# Patient Record
Sex: FEMALE | Race: WHITE | NOT HISPANIC OR LATINO | URBAN - METROPOLITAN AREA
[De-identification: names, ages, dates, MRNs, and addresses within clinical notes are randomized per-mention and may not be internally consistent; named-entity substitution may affect disease eponyms.]

---

## 2017-07-28 ENCOUNTER — OUTPATIENT (OUTPATIENT)
Dept: OUTPATIENT SERVICES | Facility: HOSPITAL | Age: 67
LOS: 1 days | End: 2017-07-28
Payer: MEDICARE

## 2017-07-28 ENCOUNTER — APPOINTMENT (OUTPATIENT)
Age: 67
End: 2017-07-28

## 2017-07-28 DIAGNOSIS — M54.16 RADICULOPATHY, LUMBAR REGION: ICD-10-CM

## 2017-07-28 PROCEDURE — 64483 NJX AA&/STRD TFRM EPI L/S 1: CPT

## 2018-11-19 ENCOUNTER — TRANSCRIPTION ENCOUNTER (OUTPATIENT)
Age: 68
End: 2018-11-19

## 2019-10-23 ENCOUNTER — APPOINTMENT (OUTPATIENT)
Dept: PULMONOLOGY | Facility: CLINIC | Age: 69
End: 2019-10-23
Payer: MEDICARE

## 2019-10-23 VITALS
SYSTOLIC BLOOD PRESSURE: 118 MMHG | TEMPERATURE: 98 F | HEART RATE: 72 BPM | BODY MASS INDEX: 22.68 KG/M2 | OXYGEN SATURATION: 97 % | DIASTOLIC BLOOD PRESSURE: 83 MMHG | RESPIRATION RATE: 16 BRPM | WEIGHT: 128 LBS | HEIGHT: 63 IN

## 2019-10-23 PROCEDURE — 95012 NITRIC OXIDE EXP GAS DETER: CPT

## 2019-10-23 PROCEDURE — 88738 HGB QUANT TRANSCUTANEOUS: CPT

## 2019-10-23 PROCEDURE — 71046 X-RAY EXAM CHEST 2 VIEWS: CPT

## 2019-10-23 PROCEDURE — 94729 DIFFUSING CAPACITY: CPT

## 2019-10-23 PROCEDURE — 36415 COLL VENOUS BLD VENIPUNCTURE: CPT

## 2019-10-23 PROCEDURE — 99204 OFFICE O/P NEW MOD 45 MIN: CPT | Mod: 25

## 2019-10-23 PROCEDURE — 94727 GAS DIL/WSHOT DETER LNG VOL: CPT

## 2019-10-23 PROCEDURE — 94060 EVALUATION OF WHEEZING: CPT

## 2019-10-24 NOTE — ASSESSMENT
[FreeTextEntry1] : Venipuncture with labs drawn in office\par Started her on doxycycline and Medrol Dosepak

## 2019-10-24 NOTE — PHYSICAL EXAM
[General Appearance - Well Developed] : well developed [Normal Appearance] : normal appearance [Well Groomed] : well groomed [General Appearance - Well Nourished] : well nourished [No Deformities] : no deformities [General Appearance - In No Acute Distress] : no acute distress [Normal Oropharynx] : normal oropharynx [Heart Rate And Rhythm] : heart rate and rhythm were normal [Heart Sounds] : normal S1 and S2 [Murmurs] : no murmurs present [Abdomen Soft] : soft [Abdomen Tenderness] : non-tender [Abdomen Mass (___ Cm)] : no abdominal mass palpated [Nail Clubbing] : no clubbing of the fingernails [Cyanosis, Localized] : no localized cyanosis [Petechial Hemorrhages (___cm)] : no petechial hemorrhages [] : no ischemic changes [FreeTextEntry1] : moderate bilateral expiratory wheezes

## 2019-10-24 NOTE — HISTORY OF PRESENT ILLNESS
[FreeTextEntry1] : Sandra is a pleasant 69-year-old female with history of osteoarthritis, she states that she has been sick for the last one week, she has greenish productive cough and chills, she also has chest congestion, but she has no shortness of breath or chest pain

## 2019-10-24 NOTE — PROCEDURE
[FreeTextEntry1] : Chest x-ray PA and lateral views performed in my office today showed clear lungs, no evidence of infiltrates or pleural effusions.\par \par Pulmonary Function Test: Lung Volume: Within normal limits; Spirometry: Within normal limits with no improvement post bronchodilator; Diffusion: Within normal limits.\par \par Exhaled nitric oxide level is  29 PPB\par

## 2019-10-27 LAB
ALBUMIN SERPL ELPH-MCNC: 4.5 G/DL
ALP BLD-CCNC: 52 U/L
ALT SERPL-CCNC: 19 U/L
ANION GAP SERPL CALC-SCNC: 12 MMOL/L
AST SERPL-CCNC: 25 U/L
BASOPHILS # BLD AUTO: 0.05 K/UL
BASOPHILS NFR BLD AUTO: 0.9 %
BILIRUB SERPL-MCNC: 0.4 MG/DL
BUN SERPL-MCNC: 21 MG/DL
CALCIUM SERPL-MCNC: 10.5 MG/DL
CHLORIDE SERPL-SCNC: 104 MMOL/L
CO2 SERPL-SCNC: 24 MMOL/L
CREAT SERPL-MCNC: 0.77 MG/DL
EOSINOPHIL # BLD AUTO: 0.1 K/UL
EOSINOPHIL NFR BLD AUTO: 1.8 %
ERYTHROCYTE [SEDIMENTATION RATE] IN BLOOD BY WESTERGREN METHOD: 19 MM/HR
GLUCOSE SERPL-MCNC: 77 MG/DL
HCT VFR BLD CALC: 40 %
HGB BLD-MCNC: 12.3 G/DL
IMM GRANULOCYTES NFR BLD AUTO: 0.2 %
LYMPHOCYTES # BLD AUTO: 1.76 K/UL
LYMPHOCYTES NFR BLD AUTO: 31.4 %
MAN DIFF?: NORMAL
MCHC RBC-ENTMCNC: 29.8 PG
MCHC RBC-ENTMCNC: 30.8 GM/DL
MCV RBC AUTO: 96.9 FL
MONOCYTES # BLD AUTO: 0.56 K/UL
MONOCYTES NFR BLD AUTO: 10 %
NEUTROPHILS # BLD AUTO: 3.12 K/UL
NEUTROPHILS NFR BLD AUTO: 55.7 %
PLATELET # BLD AUTO: 316 K/UL
POCT - HEMOGLOBIN (HGB), QUANTITATIVE, TRANSCUTANEOUS: 13.1
POTASSIUM SERPL-SCNC: 4.6 MMOL/L
PROT SERPL-MCNC: 7.1 G/DL
RBC # BLD: 4.13 M/UL
RBC # FLD: 13.2 %
SODIUM SERPL-SCNC: 140 MMOL/L
TOTAL IGE SMQN RAST: 4 KU/L
WBC # FLD AUTO: 5.6 K/UL

## 2019-10-30 ENCOUNTER — APPOINTMENT (OUTPATIENT)
Dept: PULMONOLOGY | Facility: CLINIC | Age: 69
End: 2019-10-30
Payer: MEDICARE

## 2019-10-30 VITALS
TEMPERATURE: 98.4 F | SYSTOLIC BLOOD PRESSURE: 115 MMHG | OXYGEN SATURATION: 98 % | RESPIRATION RATE: 16 BRPM | DIASTOLIC BLOOD PRESSURE: 79 MMHG | HEART RATE: 74 BPM

## 2019-10-30 PROCEDURE — G0008: CPT

## 2019-10-30 PROCEDURE — 99214 OFFICE O/P EST MOD 30 MIN: CPT | Mod: 25

## 2019-10-30 PROCEDURE — 90653 IIV ADJUVANT VACCINE IM: CPT

## 2019-10-30 PROCEDURE — 94060 EVALUATION OF WHEEZING: CPT

## 2019-10-30 NOTE — ASSESSMENT
[FreeTextEntry1] : Fluad vaccine administered today.\par Finish doxycycline\par Mucinex as an expectorant

## 2020-09-01 ENCOUNTER — APPOINTMENT (OUTPATIENT)
Dept: PULMONOLOGY | Facility: CLINIC | Age: 70
End: 2020-09-01
Payer: MEDICARE

## 2020-09-01 VITALS
DIASTOLIC BLOOD PRESSURE: 66 MMHG | HEIGHT: 63 IN | OXYGEN SATURATION: 96 % | WEIGHT: 125 LBS | SYSTOLIC BLOOD PRESSURE: 96 MMHG | BODY MASS INDEX: 22.15 KG/M2 | TEMPERATURE: 98 F | HEART RATE: 78 BPM

## 2020-09-01 PROCEDURE — G0008: CPT

## 2020-09-01 PROCEDURE — 99214 OFFICE O/P EST MOD 30 MIN: CPT | Mod: 25

## 2020-09-01 PROCEDURE — 90662 IIV NO PRSV INCREASED AG IM: CPT

## 2020-09-16 ENCOUNTER — APPOINTMENT (OUTPATIENT)
Dept: PULMONOLOGY | Facility: CLINIC | Age: 70
End: 2020-09-16
Payer: MEDICARE

## 2020-09-16 VITALS
OXYGEN SATURATION: 97 % | DIASTOLIC BLOOD PRESSURE: 83 MMHG | TEMPERATURE: 98 F | RESPIRATION RATE: 15 BRPM | HEART RATE: 63 BPM | SYSTOLIC BLOOD PRESSURE: 121 MMHG

## 2020-09-16 DIAGNOSIS — B35.1 TINEA UNGUIUM: ICD-10-CM

## 2020-09-16 PROCEDURE — 90732 PPSV23 VACC 2 YRS+ SUBQ/IM: CPT

## 2020-09-16 PROCEDURE — G0009: CPT

## 2020-09-16 PROCEDURE — 99214 OFFICE O/P EST MOD 30 MIN: CPT | Mod: 25

## 2020-09-17 NOTE — REASON FOR VISIT
[Follow-Up] : a follow-up visit [Cough] : cough [TextBox_44] : Complains of yellow nails on bilateral feet

## 2020-09-17 NOTE — ASSESSMENT
[FreeTextEntry1] : Start Lamisil for 3 months for onychomycosis.\par Start Singulair for seasonal allergies.\par Pneumovax given today.

## 2020-09-17 NOTE — DISCUSSION/SUMMARY
[FreeTextEntry1] : Bilateral toenail onychomycosis.  Postnasal drip, likely secondary to seasonal allergies

## 2020-09-17 NOTE — PHYSICAL EXAM
[No Acute Distress] : no acute distress [Normal Oropharynx] : normal oropharynx [Normal Appearance] : normal appearance [No Neck Mass] : no neck mass [Normal Rate/Rhythm] : normal rate/rhythm [Normal S1, S2] : normal s1, s2 [No Murmurs] : no murmurs [No Resp Distress] : no resp distress [Clear to Auscultation Bilaterally] : clear to auscultation bilaterally [No Abnormalities] : no abnormalities [Benign] : benign [Normal Gait] : normal gait [No Edema] : no edema [FROM] : FROM [Normal Color/ Pigmentation] : normal color/ pigmentation [No Focal Deficits] : no focal deficits [Oriented x3] : oriented x3 [Normal Affect] : normal affect [TextBox_105] : Bilateral toenail onychomycosis

## 2020-11-17 ENCOUNTER — APPOINTMENT (OUTPATIENT)
Dept: PULMONOLOGY | Facility: CLINIC | Age: 70
End: 2020-11-17
Payer: MEDICARE

## 2020-11-18 ENCOUNTER — APPOINTMENT (OUTPATIENT)
Dept: PULMONOLOGY | Facility: CLINIC | Age: 70
End: 2020-11-18
Payer: MEDICARE

## 2020-11-18 VITALS
TEMPERATURE: 98.2 F | HEART RATE: 76 BPM | SYSTOLIC BLOOD PRESSURE: 103 MMHG | DIASTOLIC BLOOD PRESSURE: 71 MMHG | OXYGEN SATURATION: 95 %

## 2020-11-18 PROCEDURE — 71046 X-RAY EXAM CHEST 2 VIEWS: CPT

## 2020-11-18 PROCEDURE — 99214 OFFICE O/P EST MOD 30 MIN: CPT | Mod: 25

## 2020-11-18 PROCEDURE — 36415 COLL VENOUS BLD VENIPUNCTURE: CPT

## 2020-11-19 NOTE — ASSESSMENT
[FreeTextEntry1] : Venipuncture with labs drawn in office\par Get noncontrast chest CT scan for further evaluation

## 2020-11-19 NOTE — PROCEDURE
[FreeTextEntry1] : \par  Xray Chest 2 Views PA/Lat             Final\par   \par Chest x-ray PA and lateral views performed in my office today showed a small left upper lobe linear density, no evidence of pleural effusions. \par \par \par  Ordered by: SCOTT DIAZ       Collected/Examined: 18Nov2020 08:04PM       \par Verification Required       Stage: Final       \par  Performed at: In Office       Performed by: SCOTT DIAZ       Resulted: 18Nov2020 08:04PM       Last Updated: 18Nov2020 08:05PM

## 2020-11-22 LAB
ACE BLD-CCNC: 31 U/L
ALBUMIN SERPL ELPH-MCNC: 4.7 G/DL
ALP BLD-CCNC: 71 U/L
ALT SERPL-CCNC: 15 U/L
ANION GAP SERPL CALC-SCNC: 8 MMOL/L
AST SERPL-CCNC: 23 U/L
BASOPHILS # BLD AUTO: 0.03 K/UL
BASOPHILS NFR BLD AUTO: 0.5 %
BILIRUB SERPL-MCNC: 0.4 MG/DL
BUN SERPL-MCNC: 16 MG/DL
CALCIUM SERPL-MCNC: 10.8 MG/DL
CHLORIDE SERPL-SCNC: 102 MMOL/L
CO2 SERPL-SCNC: 29 MMOL/L
CREAT SERPL-MCNC: 0.82 MG/DL
EOSINOPHIL # BLD AUTO: 0.08 K/UL
EOSINOPHIL NFR BLD AUTO: 1.2 %
ERYTHROCYTE [SEDIMENTATION RATE] IN BLOOD BY WESTERGREN METHOD: 17 MM/HR
GLUCOSE SERPL-MCNC: 70 MG/DL
HCT VFR BLD CALC: 41.6 %
HGB BLD-MCNC: 13.1 G/DL
IMM GRANULOCYTES NFR BLD AUTO: 0.2 %
LYMPHOCYTES # BLD AUTO: 1.92 K/UL
LYMPHOCYTES NFR BLD AUTO: 30 %
MAN DIFF?: NORMAL
MCHC RBC-ENTMCNC: 30.8 PG
MCHC RBC-ENTMCNC: 31.5 GM/DL
MCV RBC AUTO: 97.9 FL
MONOCYTES # BLD AUTO: 0.61 K/UL
MONOCYTES NFR BLD AUTO: 9.5 %
NEUTROPHILS # BLD AUTO: 3.76 K/UL
NEUTROPHILS NFR BLD AUTO: 58.6 %
PLATELET # BLD AUTO: 299 K/UL
POTASSIUM SERPL-SCNC: 4.2 MMOL/L
PROT SERPL-MCNC: 7.6 G/DL
RBC # BLD: 4.25 M/UL
RBC # FLD: 13 %
SODIUM SERPL-SCNC: 138 MMOL/L
WBC # FLD AUTO: 6.41 K/UL

## 2020-12-03 ENCOUNTER — NON-APPOINTMENT (OUTPATIENT)
Age: 70
End: 2020-12-03

## 2020-12-03 ENCOUNTER — APPOINTMENT (OUTPATIENT)
Dept: PULMONOLOGY | Facility: CLINIC | Age: 70
End: 2020-12-03

## 2020-12-07 ENCOUNTER — APPOINTMENT (OUTPATIENT)
Dept: PULMONOLOGY | Facility: CLINIC | Age: 70
End: 2020-12-07
Payer: MEDICARE

## 2020-12-07 VITALS
RESPIRATION RATE: 16 BRPM | OXYGEN SATURATION: 94 % | TEMPERATURE: 98.2 F | SYSTOLIC BLOOD PRESSURE: 129 MMHG | DIASTOLIC BLOOD PRESSURE: 84 MMHG | HEART RATE: 75 BPM

## 2020-12-07 PROCEDURE — 99214 OFFICE O/P EST MOD 30 MIN: CPT

## 2020-12-07 NOTE — ASSESSMENT
[FreeTextEntry1] : I discussed with Sandra at length regarding aforementioned chest CT scan findings in the office today, will perform PET scan and brain MRI without contrast for possible cancer staging.  If FDG uptake on PET scan is localized to the left upper lobe opacity/process, will then refer her for thoracic surgery evaluation for possible left VATS for definitive tissue diagnosis and possible curative intent.\par Advised Sandra to return the office for follow-up after the scan and head MRI are performed.

## 2020-12-07 NOTE — PROCEDURE
[FreeTextEntry1] : Chest CT scan performed on December 2, 2020 showed irregular left upper lobe spiculated masslike opacity measuring 3.2 x 2.7 x 1.7 cm.  There is a central solid component rounded by an irregular groundglass component.  Air bronchograms sign is present.  Although this may be inflammatory 80 image is suggested for further assessment.  Infectious in nature, I favor a malignant process.  Please see detailed discussion.  PET/CT imaging is suggested for further assessment.

## 2020-12-07 NOTE — DISCUSSION/SUMMARY
[FreeTextEntry1] : Please use a newly found irregular left upper lobe spiculated masslike opacity chest CT scan, possibly secondary to lung malignancy

## 2020-12-07 NOTE — REASON FOR VISIT
[Follow-Up] : a follow-up visit [Abnormal CXR/ Chest CT] : an abnormal CXR/ chest CT [TextBox_44] : Found to have abnormal chest CT scan findings, came in for chest CT scan follow-up

## 2020-12-11 ENCOUNTER — APPOINTMENT (OUTPATIENT)
Dept: PULMONOLOGY | Facility: CLINIC | Age: 70
End: 2020-12-11
Payer: MEDICARE

## 2020-12-11 VITALS
HEART RATE: 78 BPM | DIASTOLIC BLOOD PRESSURE: 78 MMHG | SYSTOLIC BLOOD PRESSURE: 118 MMHG | RESPIRATION RATE: 15 BRPM | TEMPERATURE: 98.3 F | OXYGEN SATURATION: 94 %

## 2020-12-11 DIAGNOSIS — E83.52 HYPERCALCEMIA: ICD-10-CM

## 2020-12-11 PROCEDURE — 99214 OFFICE O/P EST MOD 30 MIN: CPT

## 2020-12-12 LAB — SARS-COV-2 N GENE NPH QL NAA+PROBE: NOT DETECTED

## 2020-12-13 PROBLEM — E83.52 HYPERCALCEMIA: Status: ACTIVE | Noted: 2020-11-18

## 2020-12-13 NOTE — PROCEDURE
[FreeTextEntry1] : PET/CT scan on 20 showed minimally FDG avid irregular spiculated opacity in the left upper lobe with central solid is suspicious for an adenocarcinoma spectrum lesion.  Recommend histopathologic correlation.  There is no evidence of FDG-avid regional tatyana or distant metastases.

## 2020-12-13 NOTE — CONSULT LETTER
[Dear  ___] : Dear  [unfilled], [Courtesy Letter:] : I had the pleasure of seeing your patient, [unfilled], in my office today. [Please see my note below.] : Please see my note below. [Consult Closing:] : Thank you very much for allowing me to participate in the care of this patient.  If you have any questions, please do not hesitate to contact me. [Sincerely,] : Sincerely, [FreeTextEntry3] : Dr. Lily Wheeler [DrMarya  ___] : Dr. DIAZ

## 2020-12-13 NOTE — DISCUSSION/SUMMARY
[FreeTextEntry1] : Sandra is a patient with a newly discovered left upper lobe spiculated opacity with minimal FDG uptake on PET scan, suspicious for adenocarcinoma in situ, rule out postinflammatory changes.

## 2020-12-13 NOTE — ASSESSMENT
[FreeTextEntry1] : I discussed with Sandra at length regarding PET scan findings in the office today.  Will refer her to Dr. Boby Landers (Thoracic Surgery) for left VATS for definitive tissue diagnosis and curative intent.\par Will perform preoperative pulmonary function tests to assess her pulmonary reserve.

## 2020-12-13 NOTE — REASON FOR VISIT
[Follow-Up] : a follow-up visit [Abnormal CXR/ Chest CT] : an abnormal CXR/ chest CT [TextBox_44] : Sandra is here for PET scan follow-up.  Offers no complaints.

## 2020-12-14 ENCOUNTER — APPOINTMENT (OUTPATIENT)
Dept: PULMONOLOGY | Facility: CLINIC | Age: 70
End: 2020-12-14
Payer: MEDICARE

## 2020-12-14 VITALS
HEART RATE: 76 BPM | WEIGHT: 124 LBS | DIASTOLIC BLOOD PRESSURE: 57 MMHG | HEIGHT: 62 IN | RESPIRATION RATE: 14 BRPM | OXYGEN SATURATION: 97 % | TEMPERATURE: 97.7 F | SYSTOLIC BLOOD PRESSURE: 101 MMHG | BODY MASS INDEX: 22.82 KG/M2

## 2020-12-14 PROCEDURE — 94750: CPT

## 2020-12-14 PROCEDURE — 88738 HGB QUANT TRANSCUTANEOUS: CPT

## 2020-12-14 PROCEDURE — 94729 DIFFUSING CAPACITY: CPT

## 2020-12-14 PROCEDURE — 95012 NITRIC OXIDE EXP GAS DETER: CPT

## 2020-12-14 PROCEDURE — 94726 PLETHYSMOGRAPHY LUNG VOLUMES: CPT

## 2020-12-14 PROCEDURE — 94010 BREATHING CAPACITY TEST: CPT

## 2020-12-16 ENCOUNTER — APPOINTMENT (OUTPATIENT)
Dept: THORACIC SURGERY | Facility: CLINIC | Age: 70
End: 2020-12-16
Payer: MEDICARE

## 2020-12-16 VITALS
HEIGHT: 62 IN | DIASTOLIC BLOOD PRESSURE: 63 MMHG | SYSTOLIC BLOOD PRESSURE: 97 MMHG | RESPIRATION RATE: 16 BRPM | WEIGHT: 124 LBS | OXYGEN SATURATION: 97 % | BODY MASS INDEX: 22.82 KG/M2 | HEART RATE: 71 BPM

## 2020-12-16 DIAGNOSIS — Z80.3 FAMILY HISTORY OF MALIGNANT NEOPLASM OF BREAST: ICD-10-CM

## 2020-12-16 DIAGNOSIS — Z80.9 FAMILY HISTORY OF MALIGNANT NEOPLASM, UNSPECIFIED: ICD-10-CM

## 2020-12-16 DIAGNOSIS — Z77.090 CONTACT WITH AND (SUSPECTED) EXPOSURE TO ASBESTOS: ICD-10-CM

## 2020-12-16 DIAGNOSIS — Z87.09 PERSONAL HISTORY OF OTHER DISEASES OF THE RESPIRATORY SYSTEM: ICD-10-CM

## 2020-12-16 DIAGNOSIS — Z87.01 PERSONAL HISTORY OF PNEUMONIA (RECURRENT): ICD-10-CM

## 2020-12-16 DIAGNOSIS — Z80.8 FAMILY HISTORY OF MALIGNANT NEOPLASM OF OTHER ORGANS OR SYSTEMS: ICD-10-CM

## 2020-12-16 PROCEDURE — 99204 OFFICE O/P NEW MOD 45 MIN: CPT

## 2020-12-16 RX ORDER — MONTELUKAST 10 MG/1
10 TABLET, FILM COATED ORAL
Qty: 30 | Refills: 2 | Status: DISCONTINUED | COMMUNITY
Start: 2020-09-16 | End: 2020-12-16

## 2020-12-16 RX ORDER — ZOSTER VACCINE RECOMBINANT, ADJUVANTED 50 MCG/0.5
50 KIT INTRAMUSCULAR
Qty: 1 | Refills: 1 | Status: COMPLETED | COMMUNITY
Start: 2020-11-18 | End: 2020-12-16

## 2020-12-16 RX ORDER — CICLOPIROX OLAMINE 7.7 MG/G
0.77 CREAM TOPICAL TWICE DAILY
Qty: 3 | Refills: 3 | Status: DISCONTINUED | COMMUNITY
Start: 2020-10-21 | End: 2020-12-16

## 2020-12-16 RX ORDER — DOXYCYCLINE HYCLATE 100 MG/1
100 TABLET ORAL
Qty: 14 | Refills: 0 | Status: DISCONTINUED | COMMUNITY
Start: 2019-10-23 | End: 2020-12-16

## 2020-12-16 RX ORDER — TERBINAFINE HYDROCHLORIDE 250 MG/1
250 TABLET ORAL DAILY
Qty: 90 | Refills: 0 | Status: DISCONTINUED | COMMUNITY
Start: 2020-09-16 | End: 2020-12-16

## 2020-12-16 RX ORDER — TERBINAFINE HYDROCHLORIDE 1 G/100G
1 CREAM TOPICAL 3 TIMES DAILY
Qty: 1 | Refills: 1 | Status: DISCONTINUED | COMMUNITY
Start: 2020-09-21 | End: 2020-12-16

## 2020-12-16 RX ORDER — METHYLPREDNISOLONE 4 MG/1
4 TABLET ORAL
Qty: 1 | Refills: 0 | Status: DISCONTINUED | COMMUNITY
Start: 2019-10-23 | End: 2020-12-16

## 2020-12-18 PROBLEM — Z77.090 HISTORY OF ASBESTOS EXPOSURE: Status: RESOLVED | Noted: 2020-12-18 | Resolved: 2020-12-18

## 2020-12-18 PROBLEM — Z80.3 FAMILY HISTORY OF MALIGNANT NEOPLASM OF BREAST: Status: ACTIVE | Noted: 2020-12-18

## 2020-12-18 PROBLEM — Z87.01 HISTORY OF PNEUMONIA: Status: RESOLVED | Noted: 2020-12-18 | Resolved: 2020-12-18

## 2020-12-18 PROBLEM — Z80.9 FAMILY HISTORY OF MALIGNANT NEOPLASM: Status: ACTIVE | Noted: 2020-12-18

## 2020-12-18 PROBLEM — Z80.8 FAMILY HISTORY OF MALIGNANT NEOPLASM OF BRAIN: Status: ACTIVE | Noted: 2020-12-18

## 2020-12-18 PROBLEM — Z87.09 HISTORY OF CHRONIC BRONCHITIS: Status: RESOLVED | Noted: 2020-12-18 | Resolved: 2020-12-18

## 2020-12-18 RX ORDER — FEXOFENADINE HCL 60 MG
TABLET ORAL
Refills: 0 | Status: ACTIVE | COMMUNITY

## 2020-12-18 RX ORDER — CHROMIUM 200 MCG
TABLET ORAL
Refills: 0 | Status: ACTIVE | COMMUNITY

## 2020-12-18 RX ORDER — MELOXICAM 7.5 MG/1
7.5 TABLET ORAL
Refills: 0 | Status: ACTIVE | COMMUNITY

## 2020-12-18 NOTE — ASSESSMENT
[FreeTextEntry1] : Ms. ALY GUERRERO, 70 year old female, never smoker, w/ hx of Asthma/Bronchitis, Asbestos exposure, PNA, followed by Pulm Dr. Lily Wheeler. She presented to PCP after receiving the Pneumococcal vaccine, blood work showed elevated Calcium level (patient cannot recall the level), sent for CXR then CT Chest.\par \par PET/CT on 12/9/2020 showed a 2.4 x 1.7cm SUV=0.9 irregular spiculated TEHODORA opacity with central solid component.\par \par I have reviewed the patient's medical records and diagnostic images am suspicious that the lesion may be malignant. Based upon this  I recommended a Lt VATS Robotic-assisted THEODORA wedge rxn, possible lingula-sparing segmentectomy. \par \par Will obtain recent PFTs from Dr. Lily Wheeler's office.\par Medical clearance, PST, COVID testing prior to surgery.\par \par \par I personally performed the services described in the documentation, reviewed the documentation recorded by the scribe in my presence and it accurately and completely records my words and actions.\par \par I, Martha Nunn NP, am scribing for and the presence of KENN Wolf, the following sections HISTORY OF PRESENT ILLNESS, PAST MEDICAL/FAMILY/SOCIAL HISTORY; REVIEW OF SYSTEMS; VITAL SIGNS; PHYSICAL EXAM; DISPOSITION.\par \par

## 2020-12-18 NOTE — HISTORY OF PRESENT ILLNESS
[FreeTextEntry1] : Ms. ALY GUERRERO, 70 year old female, never smoker, w/ hx of Asthma/Bronchitis, Asbestos exposure, PNA, followed by Pulm Dr. Lily Wheeler. She presented to PCP after receiving the Pneumococcal vaccine, blood work showed elevated Calcium level (patient cannot recall the level), sent for CXR then CT Chest.\par \par CT Chest on 12/2/2020:\par - 3.2 x 2.7 x 1.7cm irregular THEODORA spiculated masslike opacity\par \par Brain MRI w/w/o contrast on 12/9/2020 showed HAKAN.\par \par PET/CT on 12/9/2020:\par - 2.4 x 1.7cm SUV=0.9 irregular spiculated THEODORA opacity with central solid component\par \par Patient is here today for CT Sx consultation, referred by Dr. Wheeler. \par

## 2020-12-18 NOTE — DATA REVIEWED
[FreeTextEntry1] : CT Chest on 12/2/2020:\par - 3.2 x 2.7 x 1.7cm irregular THEODORA spiculated masslike opacity\par \par Brain MRI w/w/o contrast on 12/9/2020 showed HAKAN.\par \par PET/CT on 12/9/2020:\par - 2.4 x 1.7cm SUV=0.9 irregular spiculated THEODORA opacity with central solid component

## 2020-12-18 NOTE — CONSULT LETTER
[Consult Letter:] : I had the pleasure of evaluating your patient, [unfilled]. [( Thank you for referring [unfilled] for consultation for _____ )] : Thank you for referring [unfilled] for consultation for [unfilled] [Please see my note below.] : Please see my note below. [Consult Closing:] : Thank you very much for allowing me to participate in the care of this patient.  If you have any questions, please do not hesitate to contact me. [Sincerely,] : Sincerely, [DrMarya  ___] : Dr. DIAZ [DrMarya ___] : Dr. DIAZ [FreeTextEntry2] : Dr. Lily Wheeler (Pulm/Referring)\par Dr. Reza (PCP)\par Dr. Stanton (Hem/Onc) [FreeTextEntry3] : Earl Landers MD, FACS \par , Division of Thoracic Surgery \par [facility] \par Chief, Thoracic Surgery \par [facility] \par Department of Cardiovascular & Thoracic Surgery \par  \par [employer] [facility] [employer] [facility]\par

## 2021-01-04 ENCOUNTER — TRANSCRIPTION ENCOUNTER (OUTPATIENT)
Age: 71
End: 2021-01-04

## 2021-02-19 ENCOUNTER — APPOINTMENT (OUTPATIENT)
Dept: PULMONOLOGY | Facility: CLINIC | Age: 71
End: 2021-02-19
Payer: MEDICARE

## 2021-02-19 PROCEDURE — 99214 OFFICE O/P EST MOD 30 MIN: CPT | Mod: 95

## 2021-02-20 NOTE — PHYSICAL EXAM
[No Acute Distress] : no acute distress [No Cyanosis] : no cyanosis [FROM] : FROM [TextBox_2] : Appears comfortable

## 2021-02-20 NOTE — HISTORY OF PRESENT ILLNESS
[TextBox_4] : Aly recently underwent left VATS with left upper lobectomy at [facility] in January 2021, was diagnosed with adenocarcinoma on surgical pathology.  She complains of left chest wall/left chest incisional pain, inadequately controlled with pain meds prescribed by [facility].  Complains of eye itch, but no shortness of breath, fever or chills.\par \par \par \par This visit was provided via telehealth using real-time 2-way audio visual technology.  The patient, ALY GUERRERO, was located at [place], [address] [place] [address]\[place] [address] at the time of the visit.  \par The provider, Lily Wheeler, was located at the office [address] [facility], [address], [place], [place] at the time of the visit. \par The patient, Ms. ALY GUERRERO  and physician Lily Wheeler DO, participated in the telehealth encounter.\par \par Verbal consent was obtained by the  from patient.\par

## 2021-02-20 NOTE — DISCUSSION/SUMMARY
[FreeTextEntry1] : Newly diagnosed left lung adenocarcinoma, status post left upper lobectomy.  IH, likely secondary to seasonal/environmental allergies.

## 2021-02-20 NOTE — ASSESSMENT
[FreeTextEntry1] : I am starting her on Singulair 10 mg p.o. nightly.\par Also advised her to follow-up with [facility] thoracic surgery team regarding postoperative care and pain control.\par Advised her to return the office for a follow-up visit in 3 months.\par \par \par Time spent in telehealth consultation and charting is 40 minutes.

## 2021-08-23 ENCOUNTER — APPOINTMENT (OUTPATIENT)
Dept: PULMONOLOGY | Facility: CLINIC | Age: 71
End: 2021-08-23
Payer: MEDICARE

## 2021-08-24 ENCOUNTER — APPOINTMENT (OUTPATIENT)
Dept: PULMONOLOGY | Facility: CLINIC | Age: 71
End: 2021-08-24
Payer: MEDICARE

## 2021-08-24 VITALS
TEMPERATURE: 97.6 F | DIASTOLIC BLOOD PRESSURE: 82 MMHG | SYSTOLIC BLOOD PRESSURE: 118 MMHG | OXYGEN SATURATION: 96 % | HEART RATE: 76 BPM

## 2021-08-24 PROCEDURE — 99214 OFFICE O/P EST MOD 30 MIN: CPT

## 2021-08-25 NOTE — HISTORY OF PRESENT ILLNESS
[TextBox_4] : Had left upper lobe lobectomy, has postoperative left chest wall neuropathy, on Neurontin and muscle relaxant.[place]acic [place] [facility] [place] chest CT.

## 2021-08-25 NOTE — ASSESSMENT
[FreeTextEntry1] : Get pulmonary function test for follow-up.\par Repeat referral made.\par Thoracic surgery follow-up with [facility].

## 2021-09-03 ENCOUNTER — APPOINTMENT (OUTPATIENT)
Dept: DISASTER EMERGENCY | Facility: CLINIC | Age: 71
End: 2021-09-03

## 2021-09-06 LAB — SARS-COV-2 N GENE NPH QL NAA+PROBE: NOT DETECTED

## 2021-09-07 ENCOUNTER — APPOINTMENT (OUTPATIENT)
Dept: PULMONOLOGY | Facility: CLINIC | Age: 71
End: 2021-09-07
Payer: MEDICARE

## 2021-09-07 VITALS
HEART RATE: 68 BPM | OXYGEN SATURATION: 95 % | DIASTOLIC BLOOD PRESSURE: 63 MMHG | SYSTOLIC BLOOD PRESSURE: 98 MMHG | RESPIRATION RATE: 14 BRPM | WEIGHT: 118 LBS | TEMPERATURE: 98.5 F | BODY MASS INDEX: 21.58 KG/M2

## 2021-09-07 PROCEDURE — 94010 BREATHING CAPACITY TEST: CPT

## 2021-09-07 PROCEDURE — 94729 DIFFUSING CAPACITY: CPT

## 2021-09-07 PROCEDURE — 94727 GAS DIL/WSHOT DETER LNG VOL: CPT

## 2021-09-07 PROCEDURE — 99214 OFFICE O/P EST MOD 30 MIN: CPT | Mod: 25

## 2021-09-07 PROCEDURE — 88738 HGB QUANT TRANSCUTANEOUS: CPT

## 2021-09-07 PROCEDURE — G0009: CPT

## 2021-09-07 PROCEDURE — 90670 PCV13 VACCINE IM: CPT

## 2021-09-07 PROCEDURE — ZZZZZ: CPT

## 2021-09-07 NOTE — HISTORY OF PRESENT ILLNESS
[FreeTextEntry1] : Follow up visit, following up with lung capacity test which was done this morning. \par c/o of right middle back pain related to lung surgery (1/21/2021).\par SOB on exertion with some wheezing  [TextBox_4] : Had left upper lobe lobectomy, has postoperative left chest wall neuropathy, on Neurontin and muscle relaxant.thoracic surgery follow-up with Dr. SANJUANITA Payne at [facility] [place] [place]. s/p repeat chest CT.

## 2021-09-07 NOTE — PROCEDURE
[FreeTextEntry1] : Pulmonary function test performed in office today: Spirometry is within normal limits; lung volume shows moderate restrictive defect; diffusion shows mild impairment.

## 2021-09-07 NOTE — ASSESSMENT
[FreeTextEntry1] : Prevnar 13 vaccine given today.\par Fluzone high-dose vaccine in 1 month.\par Pulm rehab referral made.\par Thoracic surgery follow-up with [facility].\par Check PFT for follow-up in 3 months.

## 2021-09-07 NOTE — PHYSICAL EXAM
[No Acute Distress] : no acute distress [No Cyanosis] : no cyanosis [FROM] : FROM [General Appearance - Well Developed] : well developed [Normal Appearance] : normal appearance [Well Groomed] : well groomed [General Appearance - Well Nourished] : well nourished [No Deformities] : no deformities [General Appearance - In No Acute Distress] : no acute distress [Heart Rate And Rhythm] : heart rate was normal and rhythm regular [Heart Sounds] : normal S1 and S2 [Heart Sounds Gallop] : no gallops [Murmurs] : no murmurs [Heart Sounds Pericardial Friction Rub] : no pericardial rub [Auscultation Breath Sounds / Voice Sounds] : lungs were clear to auscultation bilaterally [Bowel Sounds] : normal bowel sounds [Abdomen Soft] : soft [Abdomen Tenderness] : non-tender [] : no hepato-splenomegaly [Abdomen Mass (___ Cm)] : no abdominal mass palpated [Abnormal Walk] : normal gait [Nail Clubbing] : no clubbing  or cyanosis of the fingernails [Musculoskeletal - Swelling] : no joint swelling seen [Motor Tone] : muscle strength and tone were normal [TextBox_2] : Appears comfortable

## 2021-10-07 ENCOUNTER — APPOINTMENT (OUTPATIENT)
Dept: PULMONOLOGY | Facility: CLINIC | Age: 71
End: 2021-10-07
Payer: MEDICARE

## 2021-10-07 VITALS
DIASTOLIC BLOOD PRESSURE: 69 MMHG | TEMPERATURE: 97.8 F | OXYGEN SATURATION: 96 % | HEART RATE: 61 BPM | SYSTOLIC BLOOD PRESSURE: 107 MMHG

## 2021-10-07 PROCEDURE — 90662 IIV NO PRSV INCREASED AG IM: CPT

## 2021-10-07 PROCEDURE — G0008: CPT

## 2021-10-07 PROCEDURE — 99214 OFFICE O/P EST MOD 30 MIN: CPT | Mod: 25

## 2021-10-09 NOTE — ASSESSMENT
[FreeTextEntry1] : Pulm rehab at [facility]\par Fluzone high-dose vaccine given today.

## 2021-10-09 NOTE — PHYSICAL EXAM
[No Acute Distress] : no acute distress [Normal Oropharynx] : normal oropharynx [Normal Appearance] : normal appearance [No Neck Mass] : no neck mass [Normal Rate/Rhythm] : normal rate/rhythm [Normal S1, S2] : normal s1, s2 [No Murmurs] : no murmurs [No Resp Distress] : no resp distress [Clear to Auscultation Bilaterally] : clear to auscultation bilaterally [No Abnormalities] : no abnormalities [Benign] : benign [Normal Gait] : normal gait [No Clubbing] : no clubbing [No Cyanosis] : no cyanosis [No Edema] : no edema [FROM] : FROM [Normal Color/ Pigmentation] : normal color/ pigmentation [No Focal Deficits] : no focal deficits [Oriented x3] : oriented x3 [Normal Affect] : normal affect [TextBox_2] : Appears comfortable

## 2021-12-02 DIAGNOSIS — Z01.812 ENCOUNTER FOR PREPROCEDURAL LABORATORY EXAMINATION: ICD-10-CM

## 2021-12-09 ENCOUNTER — APPOINTMENT (OUTPATIENT)
Dept: PULMONOLOGY | Facility: CLINIC | Age: 71
End: 2021-12-09
Payer: MEDICARE

## 2021-12-09 VITALS — DIASTOLIC BLOOD PRESSURE: 86 MMHG | SYSTOLIC BLOOD PRESSURE: 132 MMHG | OXYGEN SATURATION: 100 % | HEART RATE: 63 BPM

## 2021-12-09 PROCEDURE — 88738 HGB QUANT TRANSCUTANEOUS: CPT

## 2021-12-09 PROCEDURE — 99214 OFFICE O/P EST MOD 30 MIN: CPT | Mod: 25

## 2021-12-09 PROCEDURE — ZZZZZ: CPT

## 2021-12-09 PROCEDURE — 94727 GAS DIL/WSHOT DETER LNG VOL: CPT

## 2021-12-09 PROCEDURE — 94010 BREATHING CAPACITY TEST: CPT

## 2021-12-09 PROCEDURE — 94729 DIFFUSING CAPACITY: CPT

## 2021-12-10 NOTE — ASSESSMENT
[FreeTextEntry1] : Pulmonary rehabilitation at [facility].\par Pulmonary follow-up in 3 months.\par Get chest CT scan for follow-up.

## 2021-12-13 LAB — POCT - HEMOGLOBIN (HGB), QUANTITATIVE, TRANSCUTANEOUS: 11.9

## 2022-01-09 LAB — SARS-COV-2 N GENE NPH QL NAA+PROBE: NOT DETECTED

## 2022-01-11 ENCOUNTER — APPOINTMENT (OUTPATIENT)
Dept: PULMONOLOGY | Facility: CLINIC | Age: 72
End: 2022-01-11

## 2022-03-05 LAB — SARS-COV-2 N GENE NPH QL NAA+PROBE: NOT DETECTED

## 2022-03-08 ENCOUNTER — APPOINTMENT (OUTPATIENT)
Dept: PULMONOLOGY | Facility: CLINIC | Age: 72
End: 2022-03-08
Payer: MEDICARE

## 2022-03-08 VITALS
TEMPERATURE: 97.6 F | OXYGEN SATURATION: 100 % | SYSTOLIC BLOOD PRESSURE: 116 MMHG | WEIGHT: 118 LBS | HEART RATE: 65 BPM | DIASTOLIC BLOOD PRESSURE: 76 MMHG | RESPIRATION RATE: 14 BRPM | BODY MASS INDEX: 21.71 KG/M2 | HEIGHT: 62 IN

## 2022-03-08 PROCEDURE — 94729 DIFFUSING CAPACITY: CPT

## 2022-03-08 PROCEDURE — 94727 GAS DIL/WSHOT DETER LNG VOL: CPT

## 2022-03-08 PROCEDURE — ZZZZZ: CPT

## 2022-03-08 PROCEDURE — 71046 X-RAY EXAM CHEST 2 VIEWS: CPT

## 2022-03-08 PROCEDURE — 99214 OFFICE O/P EST MOD 30 MIN: CPT | Mod: 25

## 2022-03-08 PROCEDURE — 36415 COLL VENOUS BLD VENIPUNCTURE: CPT

## 2022-03-08 PROCEDURE — 94010 BREATHING CAPACITY TEST: CPT

## 2022-03-08 RX ORDER — GABAPENTIN 800 MG/1
800 TABLET, COATED ORAL
Qty: 270 | Refills: 0 | Status: ACTIVE | COMMUNITY
Start: 2022-01-28

## 2022-03-08 NOTE — PROCEDURE
[FreeTextEntry1] : Pulmonary function test performed in my office today: Spirometry is within normal limits; lung volume is within normal limits; diffusion is within normal limits.\par \par \par  Xray Chest 2 Views PA/Lat             Final\par \par \par Chest x-ray PA and lateral views performed in my office today showed clear lungs, no evidence of infiltrates or pleural effusions. \par \par \par  Ordered by: SCOTT DIAZ       Collected/Examined: 08Mar2022 11:44AM       \par Verified by: SCOTT DIAZ 08Mar2022 11:47AM       \par  Result Communication: No patient communication needed at this time;\par Stage: Final       \par  Performed at: In Office       Performed by: SCOTT DIAZ       Resulted: 08Mar2022 11:44AM       Last Updated: 08Mar2022 11:47AM       Accession: [id number]

## 2022-03-08 NOTE — ASSESSMENT
[FreeTextEntry1] : Venipuncture with labs drawn in office\par Start Z-Lino and Medrol Dosepak.\par Continue Singulair\par Return for pulmonary follow-up in 2 weeks.

## 2022-03-08 NOTE — DISCUSSION/SUMMARY
[FreeTextEntry1] : Shortness of breath and cough likely secondary to asthmatic bronchitis.  History of lung cancer

## 2022-03-08 NOTE — HISTORY OF PRESENT ILLNESS
[TextBox_4] : Had a bad cold in December 2021, tested negative for COVID; now feels that breathing is not the same; having fatigue, SOB and occasional chest tightness \par \par Has history of neuropathy of the chest, and used to have injections for the pain, and no longer using \par \par Does feel the need to use oxygen for a few minutes \par \par

## 2022-03-09 LAB
ALBUMIN SERPL ELPH-MCNC: 5 G/DL
ALP BLD-CCNC: 51 U/L
ALT SERPL-CCNC: 18 U/L
ANION GAP SERPL CALC-SCNC: 14 MMOL/L
AST SERPL-CCNC: 23 U/L
BASOPHILS # BLD AUTO: 0.04 K/UL
BASOPHILS NFR BLD AUTO: 0.6 %
BILIRUB SERPL-MCNC: 0.6 MG/DL
BUN SERPL-MCNC: 19 MG/DL
CALCIUM SERPL-MCNC: 11.8 MG/DL
CHLORIDE SERPL-SCNC: 104 MMOL/L
CO2 SERPL-SCNC: 25 MMOL/L
COVID-19 NUCLEOCAPSID  GAM ANTIBODY INTERPRETATION: NEGATIVE
COVID-19 SPIKE DOMAIN ANTIBODY INTERPRETATION: POSITIVE
CREAT SERPL-MCNC: 0.82 MG/DL
EGFR: 77 ML/MIN/1.73M2
EOSINOPHIL # BLD AUTO: 0.09 K/UL
EOSINOPHIL NFR BLD AUTO: 1.4 %
ERYTHROCYTE [SEDIMENTATION RATE] IN BLOOD BY WESTERGREN METHOD: 15 MM/HR
GLUCOSE SERPL-MCNC: 74 MG/DL
HCT VFR BLD CALC: 44.5 %
HGB BLD-MCNC: 13.8 G/DL
IMM GRANULOCYTES NFR BLD AUTO: 0.2 %
LYMPHOCYTES # BLD AUTO: 2.03 K/UL
LYMPHOCYTES NFR BLD AUTO: 32.5 %
MAN DIFF?: NORMAL
MCHC RBC-ENTMCNC: 30.1 PG
MCHC RBC-ENTMCNC: 31 GM/DL
MCV RBC AUTO: 97.2 FL
MONOCYTES # BLD AUTO: 0.63 K/UL
MONOCYTES NFR BLD AUTO: 10.1 %
NEUTROPHILS # BLD AUTO: 3.45 K/UL
NEUTROPHILS NFR BLD AUTO: 55.2 %
PLATELET # BLD AUTO: 299 K/UL
POTASSIUM SERPL-SCNC: 5.1 MMOL/L
PROT SERPL-MCNC: 7.8 G/DL
RBC # BLD: 4.58 M/UL
RBC # FLD: 12.3 %
SARS-COV-2 AB SERPL IA-ACNC: >250 U/ML
SARS-COV-2 AB SERPL QL IA: 0.1 INDEX
SODIUM SERPL-SCNC: 143 MMOL/L
WBC # FLD AUTO: 6.25 K/UL

## 2022-03-30 ENCOUNTER — APPOINTMENT (OUTPATIENT)
Dept: PULMONOLOGY | Facility: CLINIC | Age: 72
End: 2022-03-30
Payer: MEDICARE

## 2022-03-30 VITALS
TEMPERATURE: 97.2 F | SYSTOLIC BLOOD PRESSURE: 109 MMHG | DIASTOLIC BLOOD PRESSURE: 71 MMHG | OXYGEN SATURATION: 96 % | HEART RATE: 79 BPM

## 2022-03-30 PROCEDURE — 99213 OFFICE O/P EST LOW 20 MIN: CPT

## 2022-04-01 NOTE — HISTORY OF PRESENT ILLNESS
[TextBox_4] : Had a bad cold in December 2021, tested negative for COVID; now feels that breathing is not the same; having fatigue, SOB and occasional chest tightness are much better\par \par Has history of neuropathy of the chest, and used to have injections for the pain, and no longer using \par \par Does feel the need to use oxygen for a few minutes \par \par

## 2022-04-01 NOTE — ASSESSMENT
[FreeTextEntry1] : [place] [facility] [place] Continue Singulair\par Return for pulmonary follow-up in 3 months

## 2022-04-01 NOTE — REASON FOR VISIT
[Follow-Up] : a follow-up visit [Cough] : cough [Lung Cancer] : lung cancer [Shortness of Breath] : shortness of breath

## 2022-04-01 NOTE — DISCUSSION/SUMMARY
[FreeTextEntry1] : Improved shortness of breath and cough likely secondary to asthmatic bronchitis.  History of lung cancer

## 2022-06-20 ENCOUNTER — APPOINTMENT (OUTPATIENT)
Dept: PULMONOLOGY | Facility: CLINIC | Age: 72
End: 2022-06-20
Payer: MEDICARE

## 2022-06-20 VITALS
DIASTOLIC BLOOD PRESSURE: 72 MMHG | TEMPERATURE: 98.6 F | HEART RATE: 78 BPM | OXYGEN SATURATION: 96 % | SYSTOLIC BLOOD PRESSURE: 103 MMHG

## 2022-06-20 PROCEDURE — 99213 OFFICE O/P EST LOW 20 MIN: CPT

## 2022-06-21 NOTE — ASSESSMENT
[FreeTextEntry1] : For chest CT can surveillance at [facility]\par Continue Singulair\par Return for pulmonary follow-up in 4 months\par Shingrix vaccine ordered into her pharmacy due to insurance reason.\par

## 2022-06-21 NOTE — REASON FOR VISIT
[Follow-Up] : a follow-up visit [Lung Cancer] : lung cancer [Cough] : cough [Shortness of Breath] : shortness of breath [TextBox_44] : Cough and shortness of breath are better

## 2022-10-04 ENCOUNTER — APPOINTMENT (OUTPATIENT)
Dept: PULMONOLOGY | Facility: CLINIC | Age: 72
End: 2022-10-04

## 2022-10-04 DIAGNOSIS — Z23 ENCOUNTER FOR IMMUNIZATION: ICD-10-CM

## 2022-10-04 PROCEDURE — 94010 BREATHING CAPACITY TEST: CPT

## 2022-10-04 PROCEDURE — 94729 DIFFUSING CAPACITY: CPT

## 2022-10-04 PROCEDURE — 71046 X-RAY EXAM CHEST 2 VIEWS: CPT

## 2022-10-04 PROCEDURE — 99214 OFFICE O/P EST MOD 30 MIN: CPT | Mod: 25

## 2022-10-04 PROCEDURE — 88738 HGB QUANT TRANSCUTANEOUS: CPT

## 2022-10-04 PROCEDURE — G0008: CPT

## 2022-10-04 PROCEDURE — 94727 GAS DIL/WSHOT DETER LNG VOL: CPT

## 2022-10-04 PROCEDURE — ZZZZZ: CPT

## 2022-10-04 PROCEDURE — 90662 IIV NO PRSV INCREASED AG IM: CPT

## 2022-10-04 RX ORDER — AZITHROMYCIN 250 MG/1
250 TABLET, FILM COATED ORAL
Qty: 1 | Refills: 0 | Status: COMPLETED | COMMUNITY
Start: 2022-03-08 | End: 2022-10-04

## 2022-10-04 RX ORDER — METHYLPREDNISOLONE 4 MG/1
4 TABLET ORAL
Qty: 1 | Refills: 0 | Status: COMPLETED | COMMUNITY
Start: 2022-03-08 | End: 2022-10-04

## 2022-10-04 RX ORDER — ZOSTER VACCINE RECOMBINANT, ADJUVANTED 50 MCG/0.5
50 KIT INTRAMUSCULAR
Qty: 1 | Refills: 1 | Status: COMPLETED | COMMUNITY
Start: 2022-03-30 | End: 2022-10-04

## 2022-10-04 RX ORDER — ZOSTER VACCINE RECOMBINANT, ADJUVANTED 50 MCG/0.5
50 KIT INTRAMUSCULAR
Qty: 1 | Refills: 1 | Status: COMPLETED | COMMUNITY
Start: 2022-06-20 | End: 2022-10-04

## 2022-10-11 NOTE — PROCEDURE
[FreeTextEntry1] : Pulmonary Function Test performed in my office today: Spirometry: Within normal limits; Lung Volume: Within normal limits; Diffusion: Within normal limits.\par \par \par  Xray Chest 2 Views PA/Lat             Final\par \par Chest x-ray PA and lateral views performed in my office today showed clear lungs, no evidence of infiltrates or pleural effusions. \par \par \par  Ordered by: SCOTT DIAZ       Collected/Examined: 04Oct2022 11:54AM       \par Verification Required       Stage: Final       \par  Performed at: In Office       Performed by: SCOTT DIAZ       Resulted: 04Oct2022 11:54AM       Last Updated: 04Oct2022 11:54AM

## 2022-10-11 NOTE — HISTORY OF PRESENT ILLNESS
[TextBox_4] : Having chest congestion, dry cough and shortness of breath x 2 weeks; mentioned she has changed michelle in her house and there was "a lot of dust" \par

## 2022-10-11 NOTE — ASSESSMENT
[FreeTextEntry1] : High-dose Fluzone vaccine given today.\par Promethazine syrup as needed for cough for history of lung cancer and asthmatic bronchitis.  It is medically necessary for her to take promethazine syrup for cough.\par [facility] follow-up for history of lung cancer.

## 2022-12-28 ENCOUNTER — APPOINTMENT (OUTPATIENT)
Dept: PULMONOLOGY | Facility: CLINIC | Age: 72
End: 2022-12-28
Payer: MEDICARE

## 2022-12-28 PROCEDURE — 99214 OFFICE O/P EST MOD 30 MIN: CPT | Mod: 95

## 2022-12-28 NOTE — REVIEW OF SYSTEMS
[Fever] : fever [Fatigue] : fatigue [Nasal Congestion] : nasal congestion [Postnasal Drip] : postnasal drip [Cough] : cough [Diarrhea] : diarrhea [Negative] : Endocrine

## 2022-12-28 NOTE — HISTORY OF PRESENT ILLNESS
[TextBox_4] : Aly complains of sore throat, fever, chills, headache and stomachache and diarrhea for 4 days, s/p negative COVID rapid antigen test at home.\par \par \par \par This visit was provided via telehealth using real-time 2-way audio visual technology.  The patient, ALY GUERRERO, was located at home, [address] [place] [address]\[place] [address] at the time of the visit.  \par The provider, Lily Wheeler, was located at the office [address] [place] [address], [address], [place], [place] at the time of the visit. \par The patient, Ms. ALY GUERRERO  and physician Lily Wheeler DO, participated in the telehealth encounter.\par \par Verbal consent was obtained by the  from patient.\par

## 2022-12-28 NOTE — ASSESSMENT
[FreeTextEntry1] : Advised Mercedes on well hydration and rest.\par Start Z-Lino\par Start prednisone taper.\par Advised her to take Tylenol as needed for temperature greater than 101 °F.\par Advised her to go to emergency department immediately should her symptoms get worse.\par \par \par \par Time spent in telehealth consultation and charting is 30 minutes.

## 2023-01-10 ENCOUNTER — APPOINTMENT (OUTPATIENT)
Dept: PULMONOLOGY | Facility: CLINIC | Age: 73
End: 2023-01-10
Payer: MEDICARE

## 2023-01-10 VITALS
OXYGEN SATURATION: 97 % | HEART RATE: 78 BPM | BODY MASS INDEX: 21.58 KG/M2 | DIASTOLIC BLOOD PRESSURE: 74 MMHG | WEIGHT: 118 LBS | SYSTOLIC BLOOD PRESSURE: 107 MMHG

## 2023-01-10 DIAGNOSIS — J06.9 ACUTE UPPER RESPIRATORY INFECTION, UNSPECIFIED: ICD-10-CM

## 2023-01-10 PROCEDURE — 99213 OFFICE O/P EST LOW 20 MIN: CPT | Mod: 25

## 2023-01-10 PROCEDURE — 71046 X-RAY EXAM CHEST 2 VIEWS: CPT

## 2023-01-10 NOTE — ASSESSMENT
[FreeTextEntry1] : Obtained and reviewed CXR results with patient today.\par Advised Sandra on well hydration and rest.\par Advised patient to stop taking antibiotics and steroids.\par Return for pulmonary follow up in 2 moths.

## 2023-01-10 NOTE — PROCEDURE
[FreeTextEntry1] : \par  Xray Chest 2 Views PA/Lat             Final\par \par No Documents Attached\par \par \par \par \par   \par Chest x-ray PA and lateral views performed in my office today showed clear lungs, no evidence of infiltrates or pleural effusions. \par \par \par  Ordered by: SCOTT DIAZ       Collected/Examined: 10Jan2023 11:32AM       \par Verification Required       Stage: Final       \par  Performed at: In Office       Performed by: SCOTT DIAZ       Resulted: 10Jan2023 11:32AM       Last Updated: 10Jan2023 11:32AM

## 2023-01-10 NOTE — DISCUSSION/SUMMARY
[FreeTextEntry1] : Sandra appears to have improving cough and sore throat, likely secondary to residual bacteria from prior URI.

## 2023-01-10 NOTE — ADDENDUM
[FreeTextEntry1] : I, Nadeem Hinkleadolfo, acted solely as a scribe for Dr. Lily Wheeler D.O., on this date 01/10/2023. \par \par All medical record entries made by the Scribe were at my, Dr. Lily Wheeler D.O., direction and personally dictated by me on 01/10/2023. I have reviewed the chart and agree that the record accurately reflects my personal performance of the history, physical exam, assessment and plan. I have also personally directed, reviewed, and agreed with the chart.

## 2023-01-10 NOTE — HISTORY OF PRESENT ILLNESS
[TextBox_4] : ALY GUERRERO is a 72 year old female who presents to the office for follow up evaluation of cough. Patient reports of a persistent sore throat, productive cough with phlegm, swollen lymph nodes, intermittent fever, fatigue, headaches from the back to the middle of the head that started on 12/22/2022. She states that her appetite has improved since last visit. She reports of taking her  anti-inflammatory medication which improved her arthritis symptoms. Patient denies radha COVID-19 recently.\par \par \par \par

## 2023-01-10 NOTE — REVIEW OF SYSTEMS
[Fatigue] : fatigue [Sore Throat] : sore throat [Cough] : cough [Sputum] : sputum [Negative] : Endocrine [TextBox_14] : Headaches

## 2023-02-02 ENCOUNTER — OFFICE (OUTPATIENT)
Dept: URBAN - METROPOLITAN AREA CLINIC 90 | Facility: CLINIC | Age: 73
Setting detail: OPHTHALMOLOGY
End: 2023-02-02
Payer: MEDICARE

## 2023-02-02 DIAGNOSIS — H40.033: ICD-10-CM

## 2023-02-02 DIAGNOSIS — H40.013: ICD-10-CM

## 2023-02-02 DIAGNOSIS — H02.834: ICD-10-CM

## 2023-02-02 DIAGNOSIS — H43.813: ICD-10-CM

## 2023-02-02 DIAGNOSIS — H02.403: ICD-10-CM

## 2023-02-02 DIAGNOSIS — L82.1: ICD-10-CM

## 2023-02-02 DIAGNOSIS — H25.13: ICD-10-CM

## 2023-02-02 DIAGNOSIS — H02.831: ICD-10-CM

## 2023-02-02 PROCEDURE — 92020 GONIOSCOPY: CPT | Performed by: OPHTHALMOLOGY

## 2023-02-02 PROCEDURE — 92014 COMPRE OPH EXAM EST PT 1/>: CPT | Performed by: OPHTHALMOLOGY

## 2023-02-02 PROCEDURE — 92133 CPTRZD OPH DX IMG PST SGM ON: CPT | Performed by: OPHTHALMOLOGY

## 2023-02-02 ASSESSMENT — REFRACTION_CURRENTRX
OS_CYLINDER: -0.75
OD_CYLINDER: -0.50
OD_SPHERE: +5.00
OS_AXIS: 114
OS_ADD: +2.75
OD_AXIS: 079
OD_OVR_VA: 20/
OD_CYLINDER: -0.50
OS_CYLINDER: +0.50
OS_OVR_VA: 20/
OD_ADD: +2.50
OD_ADD: +2.75
OS_AXIS: 020
OD_CYLINDER: -+0.25
OS_ADD: +2.50
OD_ADD: +2.25
OS_VPRISM_DIRECTION: PROGS
OS_ADD: +2.25
OS_VPRISM_DIRECTION: PROGS
OD_AXIS: 169
OS_AXIS: 114
OD_CYLINDER: +0.50
OD_VPRISM_DIRECTION: PROGS
OD_OVR_VA: 20/
OS_SPHERE: +4.50
OS_ADD: +2.50
OD_OVR_VA: 20/
OS_VPRISM_DIRECTION: PROGS
OS_CYLINDER: -1.00
OS_SPHERE: +5.00
OD_ADD: +2.50
OD_AXIS: 084
OD_SPHERE: +5.25
OS_OVR_VA: 20/
OS_SPHERE: +5.25
OD_SPHERE: +4.75
OS_CYLINDER: -0.75
OD_AXIS: 090
OD_VPRISM_DIRECTION: PROGS
OS_SPHERE: +5.25
OD_VPRISM_DIRECTION: PROGS
OS_OVR_VA: 20/
OD_SPHERE: +5.50
OS_AXIS: 108

## 2023-02-02 ASSESSMENT — REFRACTION_MANIFEST
OD_AXIS: 090
OS_VA1: 20/30+
OD_VA1: 20/30+
OD_VA2: 20/25
OS_CYLINDER: -0.50
OS_SPHERE: +5.00
OD_SPHERE: +5.50
OD_SPHERE: +5.00
OS_AXIS: 115
OS_CYLINDER: -1.00
OS_ADD: +2.50
OD_VA2: 20/20
OD_VA1: 20/25+
OS_VA1: 20/25+
OS_AXIS: 120
OS_SPHERE: +5.00
OD_CYLINDER: -0.50
OD_AXIS: 090
OD_ADD: +2.75
OU_VA: 20/25
OS_VA2: 20/25
OS_VA2: 20/20
OD_ADD: +2.50
OD_CYLINDER: -0.25
OS_ADD: +2.75

## 2023-02-02 ASSESSMENT — REFRACTION_AUTOREFRACTION
OS_CYLINDER: -0.50
OD_CYLINDER: -0.50
OS_SPHERE: +5.75
OD_SPHERE: +6.00
OD_AXIS: 090
OS_AXIS: 112

## 2023-02-02 ASSESSMENT — LID POSITION - PTOSIS
OS_PTOSIS: LUL 2+
OD_PTOSIS: RUL 2+

## 2023-02-02 ASSESSMENT — KERATOMETRY
OD_K1POWER_DIOPTERS: 43.50
OD_AXISANGLE_DEGREES: 071
OS_K2POWER_DIOPTERS: 43.75
OD_K2POWER_DIOPTERS: 44.00
OS_K1POWER_DIOPTERS: 43.50
OS_AXISANGLE_DEGREES: 095

## 2023-02-02 ASSESSMENT — AXIALLENGTH_DERIVED
OS_AL: 21.9203
OD_AL: 21.4696
OD_AL: 21.6322
OS_AL: 21.8365
OS_AL: 21.589
OD_AL: 21.7557

## 2023-02-02 ASSESSMENT — LID POSITION - DERMATOCHALASIS
OS_DERMATOCHALASIS: LUL 2+
OD_DERMATOCHALASIS: RUL 2+

## 2023-02-02 ASSESSMENT — SPHEQUIV_DERIVED
OD_SPHEQUIV: 5.25
OD_SPHEQUIV: 5.75
OD_SPHEQUIV: 4.875
OS_SPHEQUIV: 4.5
OS_SPHEQUIV: 5.5
OS_SPHEQUIV: 4.75

## 2023-02-02 ASSESSMENT — CONFRONTATIONAL VISUAL FIELD TEST (CVF)
OS_FINDINGS: FULL
OD_FINDINGS: FULL

## 2023-02-02 ASSESSMENT — VISUAL ACUITY
OD_BCVA: 20/20-3
OS_BCVA: 20/40-2

## 2023-02-02 ASSESSMENT — TONOMETRY
OD_IOP_MMHG: 16
OS_IOP_MMHG: 16

## 2023-03-08 ENCOUNTER — APPOINTMENT (OUTPATIENT)
Dept: PULMONOLOGY | Facility: CLINIC | Age: 73
End: 2023-03-08
Payer: MEDICARE

## 2023-03-08 VITALS — SYSTOLIC BLOOD PRESSURE: 104 MMHG | OXYGEN SATURATION: 98 % | HEART RATE: 74 BPM | DIASTOLIC BLOOD PRESSURE: 57 MMHG

## 2023-03-08 LAB — POCT - HEMOGLOBIN (HGB), QUANTITATIVE, TRANSCUTANEOUS: 13.9

## 2023-03-08 PROCEDURE — 94010 BREATHING CAPACITY TEST: CPT

## 2023-03-08 PROCEDURE — 95012 NITRIC OXIDE EXP GAS DETER: CPT

## 2023-03-08 PROCEDURE — 94727 GAS DIL/WSHOT DETER LNG VOL: CPT

## 2023-03-08 PROCEDURE — 94729 DIFFUSING CAPACITY: CPT

## 2023-03-08 PROCEDURE — 88738 HGB QUANT TRANSCUTANEOUS: CPT

## 2023-03-08 PROCEDURE — 99214 OFFICE O/P EST MOD 30 MIN: CPT | Mod: 25

## 2023-03-08 RX ORDER — BENZONATATE 100 MG/1
100 CAPSULE ORAL 3 TIMES DAILY
Qty: 90 | Refills: 5 | Status: COMPLETED | COMMUNITY
Start: 2022-10-17 | End: 2023-03-08

## 2023-03-08 RX ORDER — PREDNISONE 10 MG/1
10 TABLET ORAL
Qty: 12 | Refills: 0 | Status: COMPLETED | COMMUNITY
Start: 2022-12-28 | End: 2023-03-08

## 2023-03-08 RX ORDER — PROMETHAZINE HYDROCHLORIDE 6.25 MG/5ML
6.25 SOLUTION ORAL 4 TIMES DAILY
Qty: 300 | Refills: 1 | Status: COMPLETED | COMMUNITY
Start: 2022-10-04 | End: 2023-03-08

## 2023-03-08 RX ORDER — AZITHROMYCIN 250 MG/1
250 TABLET, FILM COATED ORAL
Qty: 1 | Refills: 0 | Status: COMPLETED | COMMUNITY
Start: 2022-12-28 | End: 2023-03-08

## 2023-03-09 NOTE — ASSESSMENT
[FreeTextEntry1] : Continue Singulair.\par [facility] follow-up regarding lung cancer/CT scan abnormalities.\par Return for pulmonary follow-up in 3 months.

## 2023-03-09 NOTE — PROCEDURE
[FreeTextEntry1] : Pulmonary function test with me in my office today: Spirometry shows suggestive evidence of mild restrictive defect; lung volumes shows mild restrictive defect; diffusion shows moderate impairment.\par _______\par \par  POCT - Hemoglobin (Hgb), quantitative, transcutaneous             Final\par \par No Documents Attached\par \par \par   Test   Result   Flag Reference Goal \par   POCT - Hemoglobin (Hgb), quantitative, transcutaneous 13.9      \par \par  Ordered by: SCOTT DIAZ       Collected/Examined: 08Mar2023 04:31PM       \par Verified by: SCOTT DIAZ 08Mar2023 08:49PM       \par  Result Communication: No patient communication needed at this time;\par Stage: Final       \par  Performed at: In Office       Performed by: BRETT STEVEN       Resulted: 08Mar2023 04:31PM       Last Updated: 08Mar2023 08:49PM       Accession: [id number]       \par ______\par \par  Exhaled Nitric Oxide             Final\par \par No Documents Attached\par \par \par   Test   Result   Flag Reference Goal \par   Exhaled Nitric Oxide 55      \par \par  Ordered by: STEPHANY FELIPE       Collected/Examined: 08Mar2023 04:14PM       \par Verified by: STEPHANY FELIPE 08Mar2023 04:25PM       \par  Result Communication: No patient communication needed at this time;\par Stage: Final       \par  Performed at: In Office       Performed by: STEPHANY FELIPE       Resulted: 08Mar2023 04:14PM       Last Updated: 08Mar2023 04:25PM       Accession: [id number]       \par

## 2023-03-09 NOTE — HISTORY OF PRESENT ILLNESS
[TextBox_4] : F/u cough \par \par Overall feeling OK; has occasional cough and wheezing \par  \par Had acute bronchitis in January; was told in [facility] that she has "crystals in the lungs" via CT scan

## 2023-03-09 NOTE — DISCUSSION/SUMMARY
[FreeTextEntry1] : Much improved cough likely secondary to recent bronchitis.  History of lung nodules/lung cancer

## 2023-03-14 ENCOUNTER — APPOINTMENT (OUTPATIENT)
Dept: PULMONOLOGY | Facility: CLINIC | Age: 73
End: 2023-03-14

## 2023-06-07 ENCOUNTER — APPOINTMENT (OUTPATIENT)
Dept: PULMONOLOGY | Facility: CLINIC | Age: 73
End: 2023-06-07
Payer: MEDICARE

## 2023-06-07 VITALS — SYSTOLIC BLOOD PRESSURE: 92 MMHG | DIASTOLIC BLOOD PRESSURE: 59 MMHG | OXYGEN SATURATION: 97 % | HEART RATE: 67 BPM

## 2023-06-07 PROCEDURE — 94010 BREATHING CAPACITY TEST: CPT

## 2023-06-07 PROCEDURE — 95012 NITRIC OXIDE EXP GAS DETER: CPT

## 2023-06-07 PROCEDURE — 99214 OFFICE O/P EST MOD 30 MIN: CPT | Mod: 25

## 2023-06-07 RX ORDER — TIZANIDINE 2 MG/1
2 TABLET ORAL
Qty: 90 | Refills: 0 | Status: COMPLETED | COMMUNITY
Start: 2022-01-10 | End: 2023-06-07

## 2023-06-07 NOTE — ASSESSMENT
[FreeTextEntry1] : Obtained and reviewed spirometry, NIOX results with patient today. \par Continue Singulair for seasonal allergies.\par Advised patient to receive pulmonary rehabilitation for better physical conditioning.\par [facility] follow-up for history of lung cancer.  \par Return for pulmonary follow up in 3 months.

## 2023-06-07 NOTE — REASON FOR VISIT
[Follow-Up] : a follow-up visit [Cough] : cough [Abnormal CXR/ Chest CT] : an abnormal CXR/ chest CT [Lung Cancer] : lung cancer

## 2023-06-07 NOTE — PHYSICAL EXAM
[No Acute Distress] : no acute distress [Normal Oropharynx] : normal oropharynx [Normal Appearance] : normal appearance [No Neck Mass] : no neck mass [Normal Rate/Rhythm] : normal rate/rhythm [Normal S1, S2] : normal s1, s2 [No Murmurs] : no murmurs [No Abnormalities] : no abnormalities [Benign] : benign [Normal Gait] : normal gait [No Clubbing] : no clubbing [No Cyanosis] : no cyanosis [No Edema] : no edema [FROM] : FROM [Normal Color/ Pigmentation] : normal color/ pigmentation [No Focal Deficits] : no focal deficits [Oriented x3] : oriented x3 [Normal Affect] : normal affect [No Resp Distress] : no resp distress [TextBox_68] : very mild inspiratory wheeze

## 2023-06-07 NOTE — DISCUSSION/SUMMARY
[FreeTextEntry1] : Ms. ALY GUERRERO is an 73 year old female, history of lung cancer. Patient appears stable from a pulmonary perspective.

## 2023-06-07 NOTE — HISTORY OF PRESENT ILLNESS
[TextBox_4] : F/u cough \par \par Overall feeling OK; has occasional dry cough and wheezing; denies SOB \par \par Would like to start pulmonary rehab

## 2023-06-07 NOTE — PROCEDURE
[FreeTextEntry1] : Spirometry is within normal limits\par ___\par \par  Exhaled Nitric Oxide             Final\par \par No Documents Attached\par \par \par   Test   Result   Flag Reference Goal \par   Exhaled Nitric Oxide 35      \par \par  Ordered by: STEPHANY FELIPE       Collected/Examined: 07Jun2023 11:32AM       \par Verified by: STEPHANY FELIPE 07Jun2023 11:35AM       \par  Result Communication: No patient communication needed at this time;\par Stage: Final       \par  Performed at: In Office       Performed by: STEPHANY FELIPE       Resulted: 07Jun2023 11:32AM       Last Updated: 07Jun2023 11:35AM       Accession: [id number]       \par

## 2023-07-17 ENCOUNTER — APPOINTMENT (OUTPATIENT)
Dept: PULMONOLOGY | Facility: CLINIC | Age: 73
End: 2023-07-17
Payer: MEDICARE

## 2023-07-17 VITALS
RESPIRATION RATE: 16 BRPM | HEART RATE: 79 BPM | OXYGEN SATURATION: 96 % | DIASTOLIC BLOOD PRESSURE: 70 MMHG | SYSTOLIC BLOOD PRESSURE: 99 MMHG

## 2023-07-17 PROCEDURE — 94664 DEMO&/EVAL PT USE INHALER: CPT | Mod: 59

## 2023-07-17 PROCEDURE — 94060 EVALUATION OF WHEEZING: CPT

## 2023-07-17 PROCEDURE — 95012 NITRIC OXIDE EXP GAS DETER: CPT

## 2023-07-17 PROCEDURE — 99214 OFFICE O/P EST MOD 30 MIN: CPT | Mod: 25

## 2023-07-17 NOTE — DISCUSSION/SUMMARY
[FreeTextEntry1] : Ms. ALY GUERRERO is an 73 year old female, history of lung cancer. Patient appears stable from a pulmonary perspective.\par elevated NIOX\par  lung exam wheeze  squeaky\par asthmatic Bronchitis\par  medrol judith\par  ZPAK\par  Trelegy 100 mcg dose  1puff QD and Rinse\par MDI instruction \par f/u Dr Wheeler 1-2 weeks \par consider f/u CXR

## 2023-07-17 NOTE — HISTORY OF PRESENT ILLNESS
[TextBox_4] : F/u cough \par states long term bronchitis sxs\par  txed antibx prednisone with some improvement\par  then relapse late June and \par  at present\par wheeze\par HA\par chest congestion\par feels some issue the air  quality\par Overall feeling OK; has occasional dry cough and wheezing; denies SOB \par \par hx Lung resection [facility] lung cancer

## 2023-07-17 NOTE — ASSESSMENT
[FreeTextEntry1] : Obtained and reviewed spirometry, NIOX results with patient today. \par Continue Singulair for seasonal allergies.\par Advised patient to receive pulmonary rehabilitation for better physical conditioning.\par [facility] follow-up for history of lung cancer.  \par Return for pulmonary follow up in 3 months.

## 2023-07-17 NOTE — PROCEDURE
[FreeTextEntry1] : NIOX  44 ppb pos  bronchial inflammation 7/17/23\par  [place] 7/17/23\par  Very milod reduction flow  rates Mild mOAD\par       \par

## 2023-07-17 NOTE — PHYSICAL EXAM
[No Acute Distress] : no acute distress [Normal Oropharynx] : normal oropharynx [Normal Appearance] : normal appearance [No Neck Mass] : no neck mass [Normal Rate/Rhythm] : normal rate/rhythm [Normal S1, S2] : normal s1, s2 [No Murmurs] : no murmurs [No Resp Distress] : no resp distress [No Abnormalities] : no abnormalities [Benign] : benign [Normal Gait] : normal gait [No Clubbing] : no clubbing [No Cyanosis] : no cyanosis [No Edema] : no edema [FROM] : FROM [Normal Color/ Pigmentation] : normal color/ pigmentation [No Focal Deficits] : no focal deficits [Oriented x3] : oriented x3 [Normal Affect] : normal affect [TextBox_68] : very mild inspiratory wheeze B/L

## 2023-07-17 NOTE — REASON FOR VISIT
[Follow-Up] : a follow-up visit [Abnormal CXR/ Chest CT] : an abnormal CXR/ chest CT [Lung Cancer] : lung cancer [Cough] : cough

## 2023-08-01 ENCOUNTER — APPOINTMENT (OUTPATIENT)
Dept: PULMONOLOGY | Facility: CLINIC | Age: 73
End: 2023-08-01
Payer: MEDICARE

## 2023-08-01 PROCEDURE — 71046 X-RAY EXAM CHEST 2 VIEWS: CPT

## 2023-08-01 PROCEDURE — 99214 OFFICE O/P EST MOD 30 MIN: CPT | Mod: 25

## 2023-08-01 NOTE — HISTORY OF PRESENT ILLNESS
[TextBox_4] : ALY GUERRERO is a 73 year old female, with history of lung cancer, who presents to the office for follow up evaluation. Patient reports of having a cough and dyspnea that started 2 months ago. She states that she was recently prescribed a Spiriva and rescue inhaler. Patient reports of taking oral steroids and antibiotics with relief to her symptoms. She states of receiving a recent chest x-ray which was unremarkable.

## 2023-08-01 NOTE — ASSESSMENT
[FreeTextEntry1] : Obtained and reviewed chest x-ray results with patient today.  Prescribed Trelegy 100/62.5/25 mcg 1 puff once a day for bronchitis treatment and provided samples. Return for pulmonary follow up in 4 weeks. Hematology/oncology follow-up regarding lung cancer care at [facility].

## 2023-08-01 NOTE — DISCUSSION/SUMMARY
[FreeTextEntry1] : Ms. ALY GUERRERO is an 73 year old female, history of lung cancer. Patient has a cough and dyspnea likely secondary to bronchitis

## 2023-08-01 NOTE — REASON FOR VISIT
[Follow-Up] : a follow-up visit [Chest Pain] : chest pain [Cough] : cough [Shortness of Breath] : shortness of breath [TextBox_44] : Bronchitis

## 2023-08-01 NOTE — ADDENDUM
[FreeTextEntry1] : I, Nadeem Hinklejamesas, acted solely as a scribe for Dr. Lily Wheeler D.O., on this date 08/01/2023.   All medical record entries made by the Scribe were at my, Dr. Lily Wheeler D.O., direction and personally dictated by me on 08/01/2023. I have reviewed the chart and agree that the record accurately reflects my personal performance of the history, physical exam, assessment and plan. I have also personally directed, reviewed, and agreed with the chart.

## 2023-08-29 ENCOUNTER — APPOINTMENT (OUTPATIENT)
Dept: PULMONOLOGY | Facility: CLINIC | Age: 73
End: 2023-08-29
Payer: MEDICARE

## 2023-08-29 VITALS — OXYGEN SATURATION: 95 % | SYSTOLIC BLOOD PRESSURE: 103 MMHG | DIASTOLIC BLOOD PRESSURE: 69 MMHG | HEART RATE: 97 BPM

## 2023-08-29 PROCEDURE — 94727 GAS DIL/WSHOT DETER LNG VOL: CPT

## 2023-08-29 PROCEDURE — 94060 EVALUATION OF WHEEZING: CPT

## 2023-08-29 PROCEDURE — ZZZZZ: CPT

## 2023-08-29 PROCEDURE — 94729 DIFFUSING CAPACITY: CPT

## 2023-08-29 PROCEDURE — 95012 NITRIC OXIDE EXP GAS DETER: CPT

## 2023-08-29 PROCEDURE — 88738 HGB QUANT TRANSCUTANEOUS: CPT

## 2023-08-29 PROCEDURE — 99214 OFFICE O/P EST MOD 30 MIN: CPT | Mod: 25

## 2023-08-29 NOTE — REASON FOR VISIT
[Follow-Up] : a follow-up visit [Cough] : cough [Shortness of Breath] : shortness of breath [Wheezing] : wheezing [Lung Cancer] : lung cancer

## 2023-08-29 NOTE — HISTORY OF PRESENT ILLNESS
[TextBox_4] : ALY GUERRERO is a 73 year old female, with history of lung cancer, who presents to the office for follow up evaluation. Patient reports that she is feeling well overall and her cough has improved since her last visit. She states that she continues to have symptoms of dyspnea and wheezing. Patient reports that she has finished her sample of Trelegy Ellipta with symptomatic relief.

## 2023-08-29 NOTE — ADDENDUM
[FreeTextEntry1] : I, Nadeem Green, acted solely as a scribe for Dr. Lily Wheeler D.O., on this date 08/29/2023.   All medical record entries made by the Scribe were at my, Dr. Lily Wheeler D.O., direction and personally dictated by me on 08/29/2023. I have reviewed the chart and agree that the record accurately reflects my personal performance of the history, physical exam, assessment and plan. I have also personally directed, reviewed, and agreed with the chart.

## 2023-08-29 NOTE — DISCUSSION/SUMMARY
[FreeTextEntry1] : Ms. ALY GUERRERO is an 73 year old female, history of lung cancer. Improving cough, dyspnea, and wheezing secondary to asthmatic bronchitis.

## 2023-08-29 NOTE — PROCEDURE
[FreeTextEntry1] : Pulmonary Function Test obtained in office today which revealed: Spirometry: Within normal limits with no improvement post bronchodilator, Lung Volume: Within normal limits, Diffusion: Within normal limits.  ___ Hgb: 13.5 ___ NIOX: 41

## 2023-08-29 NOTE — ASSESSMENT
[FreeTextEntry1] : Obtained and reviewed pulmonary function test, NIOX results with patient today.  Continue Trelegy 100/62.5/25 mcg 1 puff once a day for bronchitis treatment and provided samples for 4 weeks, then discontinue. Return for pulmonary follow up and PFT in 6 weeks. Hematology/oncology follow-up regarding lung cancer care at [facility].

## 2023-08-29 NOTE — PHYSICAL EXAM
[No Acute Distress] : no acute distress [Normal Oropharynx] : normal oropharynx [Normal Appearance] : normal appearance [No Neck Mass] : no neck mass [Normal Rate/Rhythm] : normal rate/rhythm [Normal S1, S2] : normal s1, s2 [No Murmurs] : no murmurs [No Resp Distress] : no resp distress [No Abnormalities] : no abnormalities [Benign] : benign [Normal Gait] : normal gait [No Clubbing] : no clubbing [No Cyanosis] : no cyanosis [No Edema] : no edema [FROM] : FROM [Normal Color/ Pigmentation] : normal color/ pigmentation [No Focal Deficits] : no focal deficits [Oriented x3] : oriented x3 [Normal Affect] : normal affect [TextBox_68] : Mild squeaking noises.

## 2023-08-30 LAB — POCT - HEMOGLOBIN (HGB), QUANTITATIVE, TRANSCUTANEOUS: 13.5

## 2023-09-06 ENCOUNTER — APPOINTMENT (OUTPATIENT)
Dept: PULMONOLOGY | Facility: CLINIC | Age: 73
End: 2023-09-06

## 2023-10-10 ENCOUNTER — APPOINTMENT (OUTPATIENT)
Dept: PULMONOLOGY | Facility: CLINIC | Age: 73
End: 2023-10-10
Payer: MEDICARE

## 2023-10-10 VITALS — HEART RATE: 73 BPM | DIASTOLIC BLOOD PRESSURE: 75 MMHG | OXYGEN SATURATION: 97 % | SYSTOLIC BLOOD PRESSURE: 118 MMHG

## 2023-10-10 DIAGNOSIS — J45.909 UNSPECIFIED ASTHMA, UNCOMPLICATED: ICD-10-CM

## 2023-10-10 PROCEDURE — 95012 NITRIC OXIDE EXP GAS DETER: CPT

## 2023-10-10 PROCEDURE — ZZZZZ: CPT

## 2023-10-10 PROCEDURE — 94060 EVALUATION OF WHEEZING: CPT

## 2023-10-10 PROCEDURE — 94727 GAS DIL/WSHOT DETER LNG VOL: CPT

## 2023-10-10 PROCEDURE — 36415 COLL VENOUS BLD VENIPUNCTURE: CPT

## 2023-10-10 PROCEDURE — 99214 OFFICE O/P EST MOD 30 MIN: CPT | Mod: 25

## 2023-10-10 PROCEDURE — 94729 DIFFUSING CAPACITY: CPT

## 2023-10-15 LAB
ALBUMIN SERPL ELPH-MCNC: 4.4 G/DL
ALP BLD-CCNC: 48 U/L
ALT SERPL-CCNC: 11 U/L
ANION GAP SERPL CALC-SCNC: 11 MMOL/L
AST SERPL-CCNC: 20 U/L
BASOPHILS # BLD AUTO: 0.05 K/UL
BASOPHILS NFR BLD AUTO: 0.9 %
BILIRUB SERPL-MCNC: 0.4 MG/DL
BUN SERPL-MCNC: 27 MG/DL
CALCIUM SERPL-MCNC: 11.1 MG/DL
CHLORIDE SERPL-SCNC: 103 MMOL/L
CO2 SERPL-SCNC: 27 MMOL/L
CREAT SERPL-MCNC: 0.84 MG/DL
CRP SERPL-MCNC: <3 MG/L
DEPRECATED D DIMER PPP IA-ACNC: 231 NG/ML DDU
EGFR: 74 ML/MIN/1.73M2
EOSINOPHIL # BLD AUTO: 0.07 K/UL
EOSINOPHIL NFR BLD AUTO: 1.2 %
FERRITIN SERPL-MCNC: 78 NG/ML
GLUCOSE SERPL-MCNC: 63 MG/DL
HCT VFR BLD CALC: 41.8 %
HGB BLD-MCNC: 13.3 G/DL
IMM GRANULOCYTES NFR BLD AUTO: 0.3 %
LYMPHOCYTES # BLD AUTO: 1.61 K/UL
LYMPHOCYTES NFR BLD AUTO: 27.4 %
MAN DIFF?: NORMAL
MCHC RBC-ENTMCNC: 31.4 PG
MCHC RBC-ENTMCNC: 31.8 GM/DL
MCV RBC AUTO: 98.8 FL
MONOCYTES # BLD AUTO: 0.51 K/UL
MONOCYTES NFR BLD AUTO: 8.7 %
NEUTROPHILS # BLD AUTO: 3.61 K/UL
NEUTROPHILS NFR BLD AUTO: 61.5 %
PLATELET # BLD AUTO: 305 K/UL
POTASSIUM SERPL-SCNC: 4.7 MMOL/L
PROCALCITONIN SERPL-MCNC: 0.02 NG/ML
PROT SERPL-MCNC: 7.5 G/DL
RBC # BLD: 4.23 M/UL
RBC # FLD: 12.8 %
SODIUM SERPL-SCNC: 141 MMOL/L
TROPONIN-T, HIGH SENSITIVITY: 7 NG/L
WBC # FLD AUTO: 5.87 K/UL

## 2024-01-18 ENCOUNTER — APPOINTMENT (OUTPATIENT)
Dept: PULMONOLOGY | Facility: CLINIC | Age: 74
End: 2024-01-18
Payer: MEDICARE

## 2024-01-18 VITALS — OXYGEN SATURATION: 96 % | HEART RATE: 80 BPM | SYSTOLIC BLOOD PRESSURE: 94 MMHG | DIASTOLIC BLOOD PRESSURE: 64 MMHG

## 2024-01-18 DIAGNOSIS — C34.90 MALIGNANT NEOPLASM OF UNSPECIFIED PART OF UNSPECIFIED BRONCHUS OR LUNG: ICD-10-CM

## 2024-01-18 DIAGNOSIS — R53.81 OTHER MALAISE: ICD-10-CM

## 2024-01-18 DIAGNOSIS — R91.8 OTHER NONSPECIFIC ABNORMAL FINDING OF LUNG FIELD: ICD-10-CM

## 2024-01-18 DIAGNOSIS — R06.00 DYSPNEA, UNSPECIFIED: ICD-10-CM

## 2024-01-18 DIAGNOSIS — R05.9 COUGH, UNSPECIFIED: ICD-10-CM

## 2024-01-18 PROCEDURE — 71046 X-RAY EXAM CHEST 2 VIEWS: CPT

## 2024-01-18 PROCEDURE — 99214 OFFICE O/P EST MOD 30 MIN: CPT

## 2024-01-18 PROCEDURE — 36415 COLL VENOUS BLD VENIPUNCTURE: CPT

## 2024-01-18 RX ORDER — AZITHROMYCIN 250 MG/1
250 TABLET, FILM COATED ORAL
Qty: 1 | Refills: 0 | Status: COMPLETED | COMMUNITY
Start: 2023-07-17 | End: 2024-01-18

## 2024-01-18 RX ORDER — NIRMATRELVIR AND RITONAVIR 300-100 MG
20 X 150 MG & KIT ORAL
Qty: 30 | Refills: 0 | Status: COMPLETED | COMMUNITY
Start: 2024-01-01

## 2024-01-18 RX ORDER — METHYLPREDNISOLONE 4 MG/1
4 TABLET ORAL
Qty: 1 | Refills: 0 | Status: COMPLETED | COMMUNITY
Start: 2023-07-17 | End: 2024-01-18

## 2024-01-19 LAB
ALBUMIN SERPL ELPH-MCNC: 4.5 G/DL
ALP BLD-CCNC: 46 U/L
ALT SERPL-CCNC: 14 U/L
ANION GAP SERPL CALC-SCNC: 10 MMOL/L
AST SERPL-CCNC: 21 U/L
BILIRUB SERPL-MCNC: 0.6 MG/DL
BUN SERPL-MCNC: 19 MG/DL
CALCIUM SERPL-MCNC: 10.9 MG/DL
CHLORIDE SERPL-SCNC: 103 MMOL/L
CO2 SERPL-SCNC: 26 MMOL/L
CREAT SERPL-MCNC: 1.03 MG/DL
CRP SERPL-MCNC: <3 MG/L
DEPRECATED D DIMER PPP IA-ACNC: 223 NG/ML DDU
EGFR: 58 ML/MIN/1.73M2
FERRITIN SERPL-MCNC: 96 NG/ML
GLUCOSE SERPL-MCNC: 94 MG/DL
POTASSIUM SERPL-SCNC: 4.9 MMOL/L
PROCALCITONIN SERPL-MCNC: 0.04 NG/ML
PROT SERPL-MCNC: 7 G/DL
SODIUM SERPL-SCNC: 139 MMOL/L

## 2024-01-20 NOTE — DISCUSSION/SUMMARY
[FreeTextEntry1] : Fatigue and weakness possibly secondary to postinflammatory changes from recent COVID-19 infection.

## 2024-01-20 NOTE — PROCEDURE
[FreeTextEntry1] :  Xray Chest 2 Views PA/Lat             Final  No Documents Attached    	 Chest x-ray PA and lateral views performed in my office today showed clear lungs, no evidence of infiltrates or pleural effusions.  Ordered by: SCOTT DIAZ       Collected/Examined: 18Jan2024 01:54PM       Verified by: SCOTT DIAZ 19Jan2024 05:26PM       Result Communication: No patient communication needed at this time; Stage: Final       Performed at: In Office       Performed by: SCOTT DIAZ       Resulted: 18Jan2024 01:54PM       Last Updated: 19Jan2024 05:26PM       Accession: [id number]

## 2024-01-20 NOTE — ASSESSMENT
[FreeTextEntry1] : Venipuncture with labs drawn in office Start baby aspirin 81 mg p.o. daily for 2 weeks. Oncology follow-up at [facility] for history of lung cancer.

## 2024-02-12 ENCOUNTER — RX RENEWAL (OUTPATIENT)
Age: 74
End: 2024-02-12

## 2024-02-12 RX ORDER — MONTELUKAST 10 MG/1
10 TABLET, FILM COATED ORAL
Qty: 90 | Refills: 3 | Status: ACTIVE | COMMUNITY
Start: 2021-02-19 | End: 1900-01-01

## 2024-04-18 ENCOUNTER — APPOINTMENT (OUTPATIENT)
Dept: PULMONOLOGY | Facility: CLINIC | Age: 74
End: 2024-04-18

## 2024-04-30 ENCOUNTER — OFFICE (OUTPATIENT)
Dept: URBAN - METROPOLITAN AREA CLINIC 90 | Facility: CLINIC | Age: 74
Setting detail: OPHTHALMOLOGY
End: 2024-04-30
Payer: MEDICARE

## 2024-04-30 DIAGNOSIS — H43.813: ICD-10-CM

## 2024-04-30 DIAGNOSIS — L82.1: ICD-10-CM

## 2024-04-30 DIAGNOSIS — H02.403: ICD-10-CM

## 2024-04-30 DIAGNOSIS — H25.13: ICD-10-CM

## 2024-04-30 DIAGNOSIS — H02.831: ICD-10-CM

## 2024-04-30 DIAGNOSIS — H02.834: ICD-10-CM

## 2024-04-30 DIAGNOSIS — H40.033: ICD-10-CM

## 2024-04-30 DIAGNOSIS — H40.013: ICD-10-CM

## 2024-04-30 PROBLEM — H52.7 REFRACTIVE ERROR: Status: ACTIVE | Noted: 2024-04-30

## 2024-04-30 PROCEDURE — 92014 COMPRE OPH EXAM EST PT 1/>: CPT | Performed by: OPHTHALMOLOGY

## 2024-04-30 PROCEDURE — 92133 CPTRZD OPH DX IMG PST SGM ON: CPT | Performed by: OPHTHALMOLOGY

## 2024-04-30 PROCEDURE — 92083 EXTENDED VISUAL FIELD XM: CPT | Performed by: OPHTHALMOLOGY

## 2024-04-30 ASSESSMENT — LID POSITION - DERMATOCHALASIS
OS_DERMATOCHALASIS: LUL 2+
OD_DERMATOCHALASIS: RUL 2+

## 2024-04-30 ASSESSMENT — LID POSITION - PTOSIS
OD_PTOSIS: RUL 2+
OS_PTOSIS: LUL 2+

## 2025-02-26 ENCOUNTER — OFFICE (OUTPATIENT)
Facility: LOCATION | Age: 75
Setting detail: OPHTHALMOLOGY
End: 2025-02-26
Payer: MEDICARE

## 2025-02-26 DIAGNOSIS — H40.033: ICD-10-CM

## 2025-02-26 DIAGNOSIS — H25.13: ICD-10-CM

## 2025-02-26 DIAGNOSIS — L82.1: ICD-10-CM

## 2025-02-26 DIAGNOSIS — H40.013: ICD-10-CM

## 2025-02-26 DIAGNOSIS — H02.403: ICD-10-CM

## 2025-02-26 DIAGNOSIS — H02.834: ICD-10-CM

## 2025-02-26 DIAGNOSIS — H43.813: ICD-10-CM

## 2025-02-26 DIAGNOSIS — H02.831: ICD-10-CM

## 2025-02-26 PROCEDURE — 92133 CPTRZD OPH DX IMG PST SGM ON: CPT | Performed by: OPHTHALMOLOGY

## 2025-02-26 PROCEDURE — 92014 COMPRE OPH EXAM EST PT 1/>: CPT | Performed by: OPHTHALMOLOGY

## 2025-02-26 PROCEDURE — 92083 EXTENDED VISUAL FIELD XM: CPT | Performed by: OPHTHALMOLOGY

## 2025-02-26 ASSESSMENT — REFRACTION_MANIFEST
OS_ADD: +2.75
OS_AXIS: 115
OS_VA2: 20/25
OD_SPHERE: +5.50
OS_SPHERE: +5.00
OD_CYLINDER: -0.50
OS_AXIS: 120
OS_VA1: 20/25+
OD_VA2: 20/25
OD_SPHERE: +5.50
OD_VA1: 20/30+
OS_SPHERE: +5.00
OS_VA1: 20/30+
OS_VA2: 20/25
OS_ADD: +2.50
OD_AXIS: 090
OD_VA1: 20/30+
OD_ADD: +2.75
OD_AXIS: 090
OS_VA1: 20/25+
OS_ADD: +2.75
OS_AXIS: 115
OD_CYLINDER: -0.50
OD_VA2: 20/25
OS_CYLINDER: -0.50
OS_CYLINDER: -1.00
OS_CYLINDER: -0.50
OD_ADD: +2.50
OU_VA: 20/25
OS_VA2: 20/20
OD_VA1: 20/25+
OD_ADD: +2.75
OD_SPHERE: +5.00
OD_AXIS: 090
OD_VA2: 20/20
OS_SPHERE: +5.00
OD_CYLINDER: -0.25
OU_VA: 20/25

## 2025-02-26 ASSESSMENT — REFRACTION_CURRENTRX
OS_SPHERE: +4.50
OD_SPHERE: +4.75
OS_SPHERE: +5.00
OS_ADD: +2.50
OD_SPHERE: +5.00
OD_VPRISM_DIRECTION: PROGS
OD_OVR_VA: 20/
OD_AXIS: 094
OD_CYLINDER: -+0.25
OS_SPHERE: +5.00
OD_SPHERE: +5.00
OS_AXIS: 020
OD_ADD: +2.50
OS_ADD: +2.25
OD_OVR_VA: 20/
OD_SPHERE: +5.50
OS_VPRISM_DIRECTION: PROGS
OS_CYLINDER: -0.75
OD_CYLINDER: -0.25
OD_SPHERE: +5.25
OS_ADD: +2.75
OS_ADD: +2.50
OD_AXIS: 079
OD_VPRISM_DIRECTION: PROGS
OD_CYLINDER: -0.50
OD_AXIS: 084
OS_CYLINDER: -1.00
OS_VPRISM_DIRECTION: PROGS
OS_AXIS: 115
OS_AXIS: 114
OD_AXIS: 090
OS_OVR_VA: 20/
OD_ADD: +2.50
OS_CYLINDER: -1.00
OD_ADD: +2.25
OS_AXIS: 114
OD_CYLINDER: -0.50
OS_ADD: +2.25
OD_AXIS: 169
OS_OVR_VA: 20/
OS_VPRISM_DIRECTION: PROGS
OS_SPHERE: +5.25
OS_AXIS: 108
OS_OVR_VA: 20/
OD_VPRISM_DIRECTION: PROGS
OD_ADD: +2.25
OS_SPHERE: +5.25
OS_CYLINDER: -0.75
OS_CYLINDER: +0.50
OD_ADD: +2.75
OD_OVR_VA: 20/
OD_CYLINDER: +0.50

## 2025-02-26 ASSESSMENT — REFRACTION_AUTOREFRACTION
OS_CYLINDER: -0.25
OS_AXIS: 111
OD_CYLINDER: -0.75
OD_AXIS: 092
OS_SPHERE: +6.00
OD_SPHERE: +6.50

## 2025-02-26 ASSESSMENT — LID POSITION - PTOSIS
OD_PTOSIS: RUL 2+
OS_PTOSIS: LUL 2+

## 2025-02-26 ASSESSMENT — KERATOMETRY
OD_AXISANGLE_DEGREES: 090
OD_K1POWER_DIOPTERS: 43.00
METHOD_AUTO_MANUAL: AUTO
OS_AXISANGLE_DEGREES: 090
OS_K2POWER_DIOPTERS: 43.25
OD_K2POWER_DIOPTERS: 43.00
OS_K1POWER_DIOPTERS: 43.25

## 2025-02-26 ASSESSMENT — CONFRONTATIONAL VISUAL FIELD TEST (CVF)
OS_FINDINGS: FULL
OD_FINDINGS: FULL

## 2025-02-26 ASSESSMENT — LID POSITION - DERMATOCHALASIS
OD_DERMATOCHALASIS: RUL 2+
OS_DERMATOCHALASIS: LUL 2+

## 2025-02-26 ASSESSMENT — VISUAL ACUITY
OS_BCVA: 20/40+2
OD_BCVA: 20/20-1

## 2025-07-16 ENCOUNTER — OFFICE (OUTPATIENT)
Facility: LOCATION | Age: 75
Setting detail: OPHTHALMOLOGY
End: 2025-07-16
Payer: MEDICARE

## 2025-07-16 DIAGNOSIS — H02.831: ICD-10-CM

## 2025-07-16 DIAGNOSIS — H40.013: ICD-10-CM

## 2025-07-16 DIAGNOSIS — H16.223: ICD-10-CM

## 2025-07-16 DIAGNOSIS — H02.403: ICD-10-CM

## 2025-07-16 DIAGNOSIS — H40.033: ICD-10-CM

## 2025-07-16 DIAGNOSIS — H25.13: ICD-10-CM

## 2025-07-16 DIAGNOSIS — H43.813: ICD-10-CM

## 2025-07-16 DIAGNOSIS — H02.834: ICD-10-CM

## 2025-07-16 DIAGNOSIS — H01.002: ICD-10-CM

## 2025-07-16 DIAGNOSIS — H01.005: ICD-10-CM

## 2025-07-16 DIAGNOSIS — L82.1: ICD-10-CM

## 2025-07-16 DIAGNOSIS — H52.7: ICD-10-CM

## 2025-07-16 PROCEDURE — 92133 CPTRZD OPH DX IMG PST SGM ON: CPT | Performed by: OPHTHALMOLOGY

## 2025-07-16 PROCEDURE — 92015 DETERMINE REFRACTIVE STATE: CPT | Performed by: OPHTHALMOLOGY

## 2025-07-16 PROCEDURE — 99213 OFFICE O/P EST LOW 20 MIN: CPT | Performed by: OPHTHALMOLOGY

## 2025-07-16 ASSESSMENT — LID EXAM ASSESSMENTS
OS_BLEPHARITIS: LLL 1+
OD_BLEPHARITIS: RLL 1+

## 2025-07-16 ASSESSMENT — REFRACTION_MANIFEST
OD_VA2: 20/20
OS_SPHERE: +5.00
OS_SPHERE: +5.25
OS_ADD: +2.50
OD_SPHERE: +5.50
OS_ADD: +2.75
OD_ADD: +2.75
OD_ADD: +2.75
OU_VA: 20/25
OU_VA: 20/25
OD_CYLINDER: -0.50
OS_CYLINDER: -0.50
OD_VA2: 20/25
OS_AXIS: 115
OD_AXIS: 105
OD_VA2: 20/25
OS_VA1: 20/30+
OS_SPHERE: +5.00
OD_VA1: 20/25+
OS_VA2: 20/25
OD_AXIS: 090
OD_AXIS: 090
OS_ADD: +2.75
OD_ADD: +2.75
OU_VA: 20/25-
OD_CYLINDER: -0.25
OS_AXIS: 115
OD_VA1: 20/30
OD_SPHERE: +5.50
OS_VA1: 20/25+
OD_CYLINDER: -0.50
OS_VA1: 20/25+
OS_CYLINDER: -0.50
OD_SPHERE: +5.00
OS_AXIS: 115
OD_AXIS: 090
OD_ADD: +2.50
OS_CYLINDER: -0.50
OS_VA2: 20/25
OD_VA1: 20/30+
OS_AXIS: 120
OD_VA1: 20/30+
OS_VA1: 20/30+
OD_SPHERE: +5.00
OS_CYLINDER: -1.00
OD_CYLINDER: -0.50
OS_ADD: +2.75
OS_SPHERE: +5.00
OS_VA2: 20/20

## 2025-07-16 ASSESSMENT — REFRACTION_CURRENTRX
OS_ADD: +2.50
OS_CYLINDER: -1.00
OS_OVR_VA: 20/
OS_SPHERE: +5.00
OS_SPHERE: +4.50
OD_SPHERE: +5.00
OS_VPRISM_DIRECTION: PROGS
OS_AXIS: 108
OS_ADD: +2.75
OS_OVR_VA: 20/
OD_ADD: +2.75
OD_SPHERE: +5.50
OS_SPHERE: +5.25
OS_AXIS: 114
OD_AXIS: 084
OS_SPHERE: +5.00
OS_VPRISM_DIRECTION: PROGS
OD_SPHERE: +5.25
OS_CYLINDER: -0.75
OS_ADD: +2.50
OS_AXIS: 115
OD_VPRISM_DIRECTION: PROGS
OS_SPHERE: +5.00
OD_CYLINDER: -0.25
OD_CYLINDER: -0.25
OD_OVR_VA: 20/
OS_AXIS: 020
OS_SPHERE: +5.25
OD_AXIS: 079
OD_SPHERE: +5.25
OS_VPRISM_DIRECTION: PROGS
OD_SPHERE: +5.00
OD_OVR_VA: 20/
OD_OVR_VA: 20/
OD_ADD: +2.50
OD_AXIS: 090
OD_ADD: +2.25
OS_CYLINDER: -0.50
OD_ADD: +2.50
OD_ADD: +2.50
OS_AXIS: 114
OS_CYLINDER: -0.75
OS_AXIS: 122
OS_ADD: +2.25
OS_CYLINDER: +0.50
OD_VPRISM_DIRECTION: PROGS
OS_ADD: +2.25
OD_SPHERE: +4.75
OD_CYLINDER: +0.50
OD_VPRISM_DIRECTION: PROGS
OD_AXIS: 099
OS_ADD: +2.50
OD_CYLINDER: -0.50
OD_AXIS: 094
OS_CYLINDER: -1.00
OD_CYLINDER: -+0.25
OD_CYLINDER: -0.50
OS_OVR_VA: 20/
OD_ADD: +2.25
OD_AXIS: 169

## 2025-07-16 ASSESSMENT — CONFRONTATIONAL VISUAL FIELD TEST (CVF)
OD_FINDINGS: FULL
OS_FINDINGS: FULL

## 2025-07-16 ASSESSMENT — TONOMETRY
OS_IOP_MMHG: 12
OD_IOP_MMHG: 13

## 2025-07-16 ASSESSMENT — SUPERFICIAL PUNCTATE KERATITIS (SPK)
OS_SPK: 1+
OD_SPK: 1+

## 2025-07-16 ASSESSMENT — VISUAL ACUITY
OS_BCVA: 20/30-2
OD_BCVA: 20/30+2

## 2025-07-16 ASSESSMENT — LID POSITION - PTOSIS
OD_PTOSIS: RUL 2+
OS_PTOSIS: LUL 2+

## 2025-07-16 ASSESSMENT — REFRACTION_AUTOREFRACTION
OD_SPHERE: +6.50
OS_AXIS: 114
OD_CYLINDER: -1.00
OS_SPHERE: +6.25
OD_AXIS: 103
OS_CYLINDER: -0.50

## 2025-07-16 ASSESSMENT — KERATOMETRY
OS_K2POWER_DIOPTERS: 43.25
OS_K1POWER_DIOPTERS: 43.00
OD_K2POWER_DIOPTERS: 43.50
OD_K1POWER_DIOPTERS: 43.00
METHOD_AUTO_MANUAL: AUTO
OD_AXISANGLE_DEGREES: 052
OS_AXISANGLE_DEGREES: 111

## 2025-07-16 ASSESSMENT — LID POSITION - DERMATOCHALASIS
OS_DERMATOCHALASIS: LUL 2+
OD_DERMATOCHALASIS: RUL 2+